# Patient Record
Sex: FEMALE | Race: WHITE | URBAN - METROPOLITAN AREA
[De-identification: names, ages, dates, MRNs, and addresses within clinical notes are randomized per-mention and may not be internally consistent; named-entity substitution may affect disease eponyms.]

---

## 2024-07-25 ENCOUNTER — OFFICE VISIT (OUTPATIENT)
Dept: OBGYN CLINIC | Facility: CLINIC | Age: 24
End: 2024-07-25

## 2024-07-25 VITALS — WEIGHT: 126.2 LBS | DIASTOLIC BLOOD PRESSURE: 67 MMHG | HEART RATE: 89 BPM | SYSTOLIC BLOOD PRESSURE: 108 MMHG

## 2024-07-25 DIAGNOSIS — Z34.92 PRENATAL CARE, SECOND TRIMESTER: ICD-10-CM

## 2024-07-25 DIAGNOSIS — Z59.9 FINANCIAL DIFFICULTIES: ICD-10-CM

## 2024-07-25 DIAGNOSIS — Z3A.29 29 WEEKS GESTATION OF PREGNANCY: Primary | ICD-10-CM

## 2024-07-25 DIAGNOSIS — Z59.41 FOOD INSECURITY: ICD-10-CM

## 2024-07-25 PROBLEM — O09.299 HX OF PREECLAMPSIA, PRIOR PREGNANCY, CURRENTLY PREGNANT: Status: ACTIVE | Noted: 2024-07-25

## 2024-07-25 PROBLEM — I10 CHRONIC HYPERTENSION: Status: ACTIVE | Noted: 2024-07-25

## 2024-07-25 PROCEDURE — G0145 SCR C/V CYTO,THINLAYER,RESCR: HCPCS | Performed by: SPECIALIST

## 2024-07-25 PROCEDURE — 87624 HPV HI-RISK TYP POOLED RSLT: CPT

## 2024-07-25 PROCEDURE — 99204 OFFICE O/P NEW MOD 45 MIN: CPT | Performed by: OBSTETRICS & GYNECOLOGY

## 2024-07-25 PROCEDURE — 87491 CHLMYD TRACH DNA AMP PROBE: CPT

## 2024-07-25 PROCEDURE — G0124 SCREEN C/V THIN LAYER BY MD: HCPCS | Performed by: SPECIALIST

## 2024-07-25 PROCEDURE — 87591 N.GONORRHOEAE DNA AMP PROB: CPT

## 2024-07-25 SDOH — ECONOMIC STABILITY - FOOD INSECURITY: FOOD INSECURITY: Z59.41

## 2024-07-25 SDOH — ECONOMIC STABILITY - INCOME SECURITY: PROBLEM RELATED TO HOUSING AND ECONOMIC CIRCUMSTANCES, UNSPECIFIED: Z59.9

## 2024-07-25 NOTE — PROGRESS NOTES
OB/GYN Prenatal Visit     [id number] used to conduct visit.     SUBJECTIVE:  Kerline Mcdonald is a 23 y.o.  at 29w3d (dated by second trimester ultrasound in scanned records) here for prenatal visit.  She has no obstetric complaints and denies pelvic pain, cramping/contractions, vaginal bleeding, loss of fluid, or decreased fetal movement.     She received prenatal care outside of the [place] for the beginning of this pregnancy and her prior pregnancies. Per records and patient report, she was diagnosed with preeclampsia in a prior pregnancy (). She also has chronic hypertension per records but is not on any medication. All prior deliveries were vaginal and full term per patient.     She states that this pregnancy so far has been uncomplicated. Per records, she had bacterial vaginosis at the beginning of this pregnancy and was treated with metronidazole.   Records from 24: O positive, Hgb 11.2, VDRL and HIV neg  Prenatal labs repeated today, Pap and GC/CT collected today.      Medical History:  Chronic hypertension, not on medication    Surgical History:  Left inguinal hernia repair    Allergies:   None       OBJECTIVE:  Vitals:    24 1548   BP: 108/67   Pulse: 89       FHT: 150 bpm  Fundal height: 29 cm      Physical Exam  Constitutional:       General: She is not in acute distress.     Appearance: Normal appearance.   Genitourinary:      Genitourinary Comments: Normal appearing external female genitalia, normal appearing urethral meatus. On speculum exam, normal appearing vaginal epithelium, no vaginal discharge, no bleeding, grossly normal appearing cervix.    HENT:      Head: Normocephalic and atraumatic.   Eyes:      Extraocular Movements: Extraocular movements intact.   Cardiovascular:      Rate and Rhythm: Normal rate and regular rhythm.   Pulmonary:      Effort: Pulmonary effort is normal. No respiratory distress.      Breath sounds: Normal breath sounds.   Abdominal:      Comments:  gravid   Musculoskeletal:         General: Normal range of motion.      Cervical back: Normal range of motion.   Neurological:      General: No focal deficit present.      Mental Status: She is alert.   Skin:     General: Skin is warm and dry.   Psychiatric:         Mood and Affect: Mood normal.         Behavior: Behavior normal.   Vitals reviewed.           ASSESSMENT / PLAN:    Problem List       29 weeks gestation of pregnancy    Overview     Labs  Pap smear and GC/CT collected today ()  Prenatal panel ordered ()  MSAFP - not ordered, did not present for care until 29 weeks gestation; new to   Genetic screening: will follow-up with [facility]  Vaccines:  Flu: will offer during flu season  Tdap: will offer after 27 weeks; offer at next visit (30 w)   Contraception: **  Feeding plan: **  Birth plan:  **  Delivery consent: ** (sign at 30w visit)  Ultrasounds: [facility] referral placed today (); dated by second trimester ultrasound from records; scanned into chart           Hx of preeclampsia, prior pregnancy, currently pregnant    Chronic hypertension     Other Visit Diagnoses       Financial difficulties        Food insecurity        Prenatal care, second trimester              Tdap, 28w teaching should be offered at next visit.   Delivery consent to be signed at next visit.   Return to clinic in 1 week for prenatal intake visit.  Return to clinic in 2 weeks for prenatal visit.         Linda Gallegos MD  2024  5:58 PM

## 2024-07-26 ENCOUNTER — TELEPHONE (OUTPATIENT)
Age: 24
End: 2024-07-26

## 2024-07-29 ENCOUNTER — TELEPHONE (OUTPATIENT)
Age: 24
End: 2024-07-29

## 2024-07-29 NOTE — TELEPHONE ENCOUNTER
----- Message from Arya Barrera MD sent at 7/26/2024  2:27 PM EDT -----  Regarding: RE: 29 wk transfer of care  Kong Slater,    She needs a detailed MFM US within the next two weeks.    Thanks,    Nick  ----- Message -----  From: Nohemy Pérez  Sent: 7/26/2024   1:02 PM EDT  To: Arya Barrera MD  Subject: 29 wk transfer of care                           Good afternoon Dr. Barrera,    We have a 29 wk transfer patient.    Thank you    Nohemy

## 2024-07-30 ENCOUNTER — PATIENT OUTREACH (OUTPATIENT)
Dept: OBGYN CLINIC | Facility: CLINIC | Age: 24
End: 2024-07-30

## 2024-07-30 LAB
C TRACH DNA SPEC QL NAA+PROBE: NEGATIVE
N GONORRHOEA DNA SPEC QL NAA+PROBE: NEGATIVE

## 2024-07-30 NOTE — PROGRESS NOTES
JESICA HUTCHINS received referral regarding positive SDOH/financial difficulties/food insecurity. GA is 30w1d. EED is 10/7/24. Patient is . Patient does not have insurance coverage. Patient received prenatal care outside of the [place]. Nurse intake is scheduled for .     JESICA HUTCHINS placed call to the patient, Kerline utilizing [employer] Finnish interpretor #[id number] and discussed referral. JESICA HUTCHINS also completed  PN assessment.     Kerline described that she upset at first as the the pregnancy was unplanned but it is now welcomed. She does not want to have more children and wants surgery. She will talk about tubal ligation with the obstetrician at upcoming appointments. She fears having pre-eclampsia again as she had it in a previous pregnancy.     Kerline will be meeting with financial counselors at [facility] after the appointment on Thursday. JESICA HUTCHINS explained separate financial assistance for [facility] ([phone]) for the hospital/delivery. JESICA HUTCHINS reviewed emergency medical assistance requirements and she does not meet the criteria for high risk pregnancy at this time. Kerline would like to review it and JESICA HUTCHINS emailed Finnish version following conversation.     Kerline disclosed that she moved here 1 month ago but had left [facility] in March. She came with documents and immigration accepted them. She estelle not need legal resources at this time.     JESICA HUTCHINS and Kerline discussed WIC. She expressed interst and JESICA HUTCHINS will send more information. She reported food security and not need food bank resources at this time. She has financial concerns but does not need information on baby supplies.     Kerline does not have a mental health or substance use history. Not currently using drugs or alcohol. No history of abuse.     Patient was referred on [employer] to [facility] for food    JESICA HUTCHINS will close referral as requested information was provided. Kerline is aware to contact office or JESICA HUTCHINS directly as needs arise. JESICA HUTCHINS will remain available for  psychosocial support as needed.

## 2024-08-01 ENCOUNTER — APPOINTMENT (OUTPATIENT)
Dept: LAB | Facility: CLINIC | Age: 24
End: 2024-08-01

## 2024-08-01 ENCOUNTER — INITIAL PRENATAL (OUTPATIENT)
Dept: OBGYN CLINIC | Facility: CLINIC | Age: 24
End: 2024-08-01

## 2024-08-01 ENCOUNTER — TELEPHONE (OUTPATIENT)
Dept: OBGYN CLINIC | Facility: CLINIC | Age: 24
End: 2024-08-01

## 2024-08-01 VITALS
HEIGHT: 60 IN | WEIGHT: 125.6 LBS | SYSTOLIC BLOOD PRESSURE: 109 MMHG | HEART RATE: 85 BPM | BODY MASS INDEX: 24.66 KG/M2 | DIASTOLIC BLOOD PRESSURE: 65 MMHG

## 2024-08-01 DIAGNOSIS — Z34.93 PRENATAL CARE IN THIRD TRIMESTER: Primary | ICD-10-CM

## 2024-08-01 DIAGNOSIS — Z3A.29 29 WEEKS GESTATION OF PREGNANCY: ICD-10-CM

## 2024-08-01 DIAGNOSIS — Z3A.30 30 WEEKS GESTATION OF PREGNANCY: ICD-10-CM

## 2024-08-01 DIAGNOSIS — Z34.92 PRENATAL CARE, SECOND TRIMESTER: ICD-10-CM

## 2024-08-01 LAB
ABO GROUP BLD: NORMAL
BACTERIA UR QL AUTO: ABNORMAL /HPF
BASOPHILS # BLD AUTO: 0.03 THOUSANDS/ÂΜL (ref 0–0.1)
BASOPHILS NFR BLD AUTO: 0 % (ref 0–1)
BILIRUB UR QL STRIP: NEGATIVE
BLD GP AB SCN SERPL QL: NEGATIVE
CAOX CRY URNS QL MICRO: ABNORMAL /HPF
CLARITY UR: ABNORMAL
COLOR UR: ABNORMAL
EOSINOPHIL # BLD AUTO: 0.13 THOUSAND/ÂΜL (ref 0–0.61)
EOSINOPHIL NFR BLD AUTO: 1 % (ref 0–6)
ERYTHROCYTE [DISTWIDTH] IN BLOOD BY AUTOMATED COUNT: 13.9 % (ref 11.6–15.1)
FERRITIN SERPL-MCNC: 6 NG/ML (ref 11–307)
GLUCOSE UR STRIP-MCNC: NEGATIVE MG/DL
HBV SURFACE AB SER-ACNC: 5.24 MIU/ML
HBV SURFACE AG SER QL: NORMAL
HCT VFR BLD AUTO: 33.1 % (ref 34.8–46.1)
HCV AB SER QL: NORMAL
HGB BLD-MCNC: 10.4 G/DL (ref 11.5–15.4)
HGB UR QL STRIP.AUTO: NEGATIVE
HPV HR 12 DNA CVX QL NAA+PROBE: POSITIVE
HPV16 DNA CVX QL NAA+PROBE: NEGATIVE
HPV18 DNA CVX QL NAA+PROBE: NEGATIVE
IMM GRANULOCYTES # BLD AUTO: 0.06 THOUSAND/UL (ref 0–0.2)
IMM GRANULOCYTES NFR BLD AUTO: 1 % (ref 0–2)
KETONES UR STRIP-MCNC: NEGATIVE MG/DL
LAB AP GYN PRIMARY INTERPRETATION: ABNORMAL
LEUKOCYTE ESTERASE UR QL STRIP: ABNORMAL
LYMPHOCYTES # BLD AUTO: 2.15 THOUSANDS/ÂΜL (ref 0.6–4.47)
LYMPHOCYTES NFR BLD AUTO: 18 % (ref 14–44)
Lab: ABNORMAL
MCH RBC QN AUTO: 28.3 PG (ref 26.8–34.3)
MCHC RBC AUTO-ENTMCNC: 31.4 G/DL (ref 31.4–37.4)
MCV RBC AUTO: 90 FL (ref 82–98)
MONOCYTES # BLD AUTO: 0.92 THOUSAND/ÂΜL (ref 0.17–1.22)
MONOCYTES NFR BLD AUTO: 8 % (ref 4–12)
MUCOUS THREADS UR QL AUTO: ABNORMAL
NEUTROPHILS # BLD AUTO: 8.79 THOUSANDS/ÂΜL (ref 1.85–7.62)
NEUTS SEG NFR BLD AUTO: 72 % (ref 43–75)
NITRITE UR QL STRIP: NEGATIVE
NON-SQ EPI CELLS URNS QL MICRO: ABNORMAL /HPF
NRBC BLD AUTO-RTO: 0 /100 WBCS
PATH INTERP SPEC-IMP: ABNORMAL
PH UR STRIP.AUTO: 6.5 [PH]
PLATELET # BLD AUTO: 337 THOUSANDS/UL (ref 149–390)
PMV BLD AUTO: 10.4 FL (ref 8.9–12.7)
PROT UR STRIP-MCNC: ABNORMAL MG/DL
RBC # BLD AUTO: 3.67 MILLION/UL (ref 3.81–5.12)
RBC #/AREA URNS AUTO: ABNORMAL /HPF
RH BLD: POSITIVE
RUBV IGG SERPL IA-ACNC: 29.2 IU/ML
SP GR UR STRIP.AUTO: 1.01 (ref 1–1.03)
SPECIMEN EXPIRATION DATE: NORMAL
UROBILINOGEN UR STRIP-ACNC: <2 MG/DL
WBC # BLD AUTO: 12.08 THOUSAND/UL (ref 4.31–10.16)
WBC #/AREA URNS AUTO: ABNORMAL /HPF

## 2024-08-01 PROCEDURE — 86900 BLOOD TYPING SEROLOGIC ABO: CPT

## 2024-08-01 PROCEDURE — 86803 HEPATITIS C AB TEST: CPT

## 2024-08-01 PROCEDURE — 86901 BLOOD TYPING SEROLOGIC RH(D): CPT

## 2024-08-01 PROCEDURE — 81001 URINALYSIS AUTO W/SCOPE: CPT

## 2024-08-01 PROCEDURE — 86850 RBC ANTIBODY SCREEN: CPT

## 2024-08-01 PROCEDURE — 36415 COLL VENOUS BLD VENIPUNCTURE: CPT

## 2024-08-01 PROCEDURE — 87340 HEPATITIS B SURFACE AG IA: CPT

## 2024-08-01 PROCEDURE — 99211 OFF/OP EST MAY X REQ PHY/QHP: CPT

## 2024-08-01 PROCEDURE — 87147 CULTURE TYPE IMMUNOLOGIC: CPT

## 2024-08-01 PROCEDURE — 86706 HEP B SURFACE ANTIBODY: CPT

## 2024-08-01 PROCEDURE — 87086 URINE CULTURE/COLONY COUNT: CPT

## 2024-08-01 PROCEDURE — 87389 HIV-1 AG W/HIV-1&-2 AB AG IA: CPT

## 2024-08-01 PROCEDURE — 85025 COMPLETE CBC W/AUTO DIFF WBC: CPT

## 2024-08-01 PROCEDURE — 82728 ASSAY OF FERRITIN: CPT

## 2024-08-01 PROCEDURE — 86762 RUBELLA ANTIBODY: CPT

## 2024-08-01 PROCEDURE — 86780 TREPONEMA PALLIDUM: CPT

## 2024-08-01 NOTE — LETTER
"    [facility] REFERRAL      Date: 8/1/2024    Patient name: Kerline Mcdonald    YOB: 2000    Estimated Date of Delivery: 10/7/24      /65 (BP Location: Left arm, Patient Position: Sitting, Cuff Size: Standard)   Pulse 85   Ht 4' 11.84\" (1.52 m)   Wt 57 kg (125 lb 9.6 oz)   BMI 24.66 kg/m²         Thank you,  RAE Fitzpatrick            [facility] locations:   1. [facility]       [address] [place] [address]       Phone: [phone]       Fax: [phone]     2. [facility]       [address] [address]       Phone: [phone]       Fax: [phone]       "

## 2024-08-01 NOTE — LETTER
8/1/2024      This letter is to confirm that Kerline Mcdonald is pregnant and is under our care.  Her Estimated Date of Delivery: 10/7/24.    If you have any questions or concerns, please contact our office.  Thank you,    RAE Fitzpatrick

## 2024-08-01 NOTE — PATIENT INSTRUCTIONS
SENALES WIL EL EMBARAZO  Llame a nuestra oficina al [phone] para cualquiera de los siguientes:    1. sangrado vaginal  2. Dolor abdominal thom que no desaparece.  3. Fiebre (más de 100.4 y no se garrett con Tylenol)  4. Vómitos persistentes que fairchild más de 24 horas.  5. dolor de pecho  6. Dolor o ardor al orinar.  7. Dolor de portia severo que no se resuelve con Tylenol  8. Visión borrosa o jerome puntos en schmid visión.  9. Hinchazón repentina de schmid josé o deng.  10. Enrojecimiento, hinchazón o dolor en ju pierna.  11. Un aumento de peso repentino en pocos días.  12. Contar los movimientos fetales del bebé. (después de 28 semanas o el sexto mes de embarazo)  13. Ju pérdida de líquido acuoso de la vagina: puede ser un chorro, un goteo o ju humedad continua  14. Después de 20 semanas de embarazo, calambres rítmicos (más de 4 por hora) o menstruales hollis dolor bajo / pélvico

## 2024-08-01 NOTE — PROGRESS NOTES
"OBSTETRIC INTAKE VISIT    Kerline Mcdonald presents today for initial OB visit and intake at 30w3d. LMP - 24.Pt is a transfer from [place].  28 week teaching done. Breast pump not ordered. History obtained from patient and she reports it as follows:    Past Medical History:   Diagnosis Date    Asthma     as a child    Chronic hypertension     Hx of preeclampsia, prior pregnancy, currently pregnant      Past Surgical History:   Procedure Laterality Date    INGUINAL HERNIA REPAIR Left      OB History    Para Term  AB Living   4 2 2 0 1 2   SAB IAB Ectopic Multiple Live Births   1 0 0 0 2      # Outcome Date GA Lbr Jaswinder/2nd Weight Sex Type Anes PTL Lv   4 Current            3 SAB            2 Term 20 40w0d   M Vag-Spont   ANTHONY      Birth Comments: Preeclampsia   1 Term 17 40w0d   M Vag-Spont   ANTHONY     Social History     Tobacco Use    Smoking status: Never     Passive exposure: Never    Smokeless tobacco: Never   Vaping Use    Vaping status: Never Used   Substance Use Topics    Alcohol use: Not Currently    Drug use: Never       No current outpatient medications    No Known Allergies    Vitals: /65 (BP Location: Left arm, Patient Position: Sitting, Cuff Size: Standard)   Pulse 85   Ht 4' 11.84\" (1.52 m)   Wt 57 kg (125 lb 9.6 oz)   BMI 24.66 kg/m²     Review of Systems:  Denies vaginal bleeding or leaking fluid.  Denies abdominal/pelvic pain or contractions. Pt states baby is moving around a lot.    Plan:  1. OB labwork completed.  Comprehensive metabolic panel and Protein/Creatinine Ratio Urine ordered today.  2. Next ultrasound is scheduled for 8/15/24.  3. Return  24  for H&P prenatal visit.  4. Referrals placed for WIC, .  5. Given vaccines: Pt states received Tdap 24 at 22 weeks. Per Dr Schneider she does not need another dose.  6. Patient's depression screening was assessed with a PHQ-2 score of 0. Their PHQ-9 score was 3. Clinically patient " does not have depression. No treatment is required.   already spoke to pt on Tuesday over the phone.  7. Reviewed the following educational topics with patient:   -routine prenatal visit/ultrasound/labwork schedule   -hospital for delivery and office phone/answering service contact information   -nutritional demands of pregnancy, healthy dietary habits   -listeria, toxoplasmosis, seafood precautions   -weight gain expectations (based on pre-pregnant BMI)   -exercise, rest, and sexual activity during pregnancy   -abstinence from alcohol, tobacco, and illegal drugs   -common discomforts of pregnancy and appropriate management   -OTC medications safe to use in pregnancy   -genetic screening options   -vaccines in pregnancy   -symptoms to report to OB provider    -signs of PTL and pre-eclampsia    -vaginal bleeding/leaking of fluid    -severe n/v-unable to tolerate ANY food/fluids for more than 24 hours

## 2024-08-02 ENCOUNTER — TELEPHONE (OUTPATIENT)
Dept: LABOR AND DELIVERY | Facility: HOSPITAL | Age: 24
End: 2024-08-02

## 2024-08-02 DIAGNOSIS — D50.9 IRON DEFICIENCY ANEMIA: Primary | ICD-10-CM

## 2024-08-02 DIAGNOSIS — D64.9 ANEMIA: ICD-10-CM

## 2024-08-02 PROBLEM — Z78.9 NONIMMUNE TO HEPATITIS B VIRUS: Status: ACTIVE | Noted: 2024-08-02

## 2024-08-02 LAB
HIV 1+2 AB+HIV1 P24 AG SERPL QL IA: NORMAL
HIV 2 AB SERPL QL IA: NORMAL
HIV1 AB SERPL QL IA: NORMAL
HIV1 P24 AG SERPL QL IA: NORMAL
TREPONEMA PALLIDUM IGG+IGM AB [PRESENCE] IN SERUM OR PLASMA BY IMMUNOASSAY: NORMAL

## 2024-08-02 RX ORDER — FERROUS SULFATE 324(65)MG
324 TABLET, DELAYED RELEASE (ENTERIC COATED) ORAL DAILY
Qty: 30 TABLET | Refills: 3 | Status: SHIPPED | OUTPATIENT
Start: 2024-08-02 | End: 2024-11-30

## 2024-08-02 NOTE — TELEPHONE ENCOUNTER
[id number] used to conduct phone conversation.     I called Kerline regarding her lab results from her recent prenatal visit. We discussed her pap smear results as ASCUS and positive for other high risk HPV. We discussed the need for a colposcopy to be scheduled with the office. She expressed understanding.    We also discussed that her hemoglobin and ferritin were low. Oral iron was ordered to her pharmacy. We discussed taking the iron daily. She will pick it up at the pharmacy.     All questions answered to her satisfaction.       Linda Gallegos MD  OBGYN, PGY-I  08/02/24  3:02 PM

## 2024-08-03 LAB
BACTERIA UR CULT: ABNORMAL
BACTERIA UR CULT: ABNORMAL

## 2024-08-06 PROBLEM — B95.1 POSITIVE GBS TEST: Status: ACTIVE | Noted: 2024-08-06

## 2024-08-06 PROBLEM — R82.71 GBS BACTERIURIA: Status: ACTIVE | Noted: 2024-08-06

## 2024-08-06 PROBLEM — R82.71 GBS BACTERIURIA: Status: RESOLVED | Noted: 2024-08-06 | Resolved: 2024-08-06

## 2024-08-12 ENCOUNTER — APPOINTMENT (OUTPATIENT)
Dept: LAB | Facility: CLINIC | Age: 24
End: 2024-08-12

## 2024-08-12 ENCOUNTER — INITIAL PRENATAL (OUTPATIENT)
Dept: OBGYN CLINIC | Facility: CLINIC | Age: 24
End: 2024-08-12

## 2024-08-12 VITALS
HEIGHT: 60 IN | WEIGHT: 128.2 LBS | SYSTOLIC BLOOD PRESSURE: 113 MMHG | BODY MASS INDEX: 25.17 KG/M2 | DIASTOLIC BLOOD PRESSURE: 70 MMHG | HEART RATE: 83 BPM

## 2024-08-12 DIAGNOSIS — O10.919 CHRONIC HYPERTENSION AFFECTING PREGNANCY: ICD-10-CM

## 2024-08-12 DIAGNOSIS — Z3A.30 30 WEEKS GESTATION OF PREGNANCY: ICD-10-CM

## 2024-08-12 DIAGNOSIS — Z34.93 PRENATAL CARE IN THIRD TRIMESTER: ICD-10-CM

## 2024-08-12 DIAGNOSIS — O99.013 MATERNAL IRON DEFICIENCY ANEMIA COMPLICATING PREGNANCY IN THIRD TRIMESTER: ICD-10-CM

## 2024-08-12 DIAGNOSIS — D50.9 MATERNAL IRON DEFICIENCY ANEMIA COMPLICATING PREGNANCY IN THIRD TRIMESTER: ICD-10-CM

## 2024-08-12 DIAGNOSIS — Z3A.32 32 WEEKS GESTATION OF PREGNANCY: ICD-10-CM

## 2024-08-12 DIAGNOSIS — Z34.93 PRENATAL CARE IN THIRD TRIMESTER: Primary | ICD-10-CM

## 2024-08-12 DIAGNOSIS — Z23 ENCOUNTER FOR IMMUNIZATION: ICD-10-CM

## 2024-08-12 PROBLEM — R82.71 GBS BACTERIURIA: Status: ACTIVE | Noted: 2024-08-06

## 2024-08-12 PROBLEM — O99.019 MATERNAL IRON DEFICIENCY ANEMIA COMPLICATING PREGNANCY: Status: ACTIVE | Noted: 2024-08-12

## 2024-08-12 PROBLEM — R87.619 ABNORMAL PAP SMEAR OF CERVIX: Status: ACTIVE | Noted: 2024-08-12

## 2024-08-12 LAB
ALBUMIN SERPL BCG-MCNC: 2.9 G/DL (ref 3.5–5)
ALP SERPL-CCNC: 102 U/L (ref 34–104)
ALT SERPL W P-5'-P-CCNC: 7 U/L (ref 7–52)
ANION GAP SERPL CALCULATED.3IONS-SCNC: 6 MMOL/L (ref 4–13)
AST SERPL W P-5'-P-CCNC: 14 U/L (ref 13–39)
BILIRUB SERPL-MCNC: 0.36 MG/DL (ref 0.2–1)
BUN SERPL-MCNC: 6 MG/DL (ref 5–25)
CALCIUM ALBUM COR SERPL-MCNC: 9.2 MG/DL (ref 8.3–10.1)
CALCIUM SERPL-MCNC: 8.3 MG/DL (ref 8.4–10.2)
CHLORIDE SERPL-SCNC: 106 MMOL/L (ref 96–108)
CO2 SERPL-SCNC: 24 MMOL/L (ref 21–32)
CREAT SERPL-MCNC: 0.47 MG/DL (ref 0.6–1.3)
CREAT UR-MCNC: 73.3 MG/DL
GFR SERPL CREATININE-BSD FRML MDRD: 138 ML/MIN/1.73SQ M
GLUCOSE 1H P 50 G GLC PO SERPL-MCNC: 95 MG/DL (ref 70–134)
GLUCOSE SERPL-MCNC: 93 MG/DL (ref 65–140)
POTASSIUM SERPL-SCNC: 3.6 MMOL/L (ref 3.5–5.3)
PROT SERPL-MCNC: 6.1 G/DL (ref 6.4–8.4)
PROT UR-MCNC: 8 MG/DL
PROT/CREAT UR: 0.11 MG/G{CREAT} (ref 0–0.1)
SODIUM SERPL-SCNC: 136 MMOL/L (ref 135–147)

## 2024-08-12 PROCEDURE — 36415 COLL VENOUS BLD VENIPUNCTURE: CPT

## 2024-08-12 PROCEDURE — 90471 IMMUNIZATION ADMIN: CPT | Performed by: NURSE PRACTITIONER

## 2024-08-12 PROCEDURE — 82950 GLUCOSE TEST: CPT

## 2024-08-12 PROCEDURE — 82570 ASSAY OF URINE CREATININE: CPT

## 2024-08-12 PROCEDURE — 90715 TDAP VACCINE 7 YRS/> IM: CPT | Performed by: NURSE PRACTITIONER

## 2024-08-12 PROCEDURE — 84156 ASSAY OF PROTEIN URINE: CPT

## 2024-08-12 PROCEDURE — 99213 OFFICE O/P EST LOW 20 MIN: CPT | Performed by: NURSE PRACTITIONER

## 2024-08-12 PROCEDURE — 83020 HEMOGLOBIN ELECTROPHORESIS: CPT

## 2024-08-12 PROCEDURE — 80053 COMPREHEN METABOLIC PANEL: CPT

## 2024-08-12 RX ORDER — FERROUS SULFATE 324(65)MG
324 TABLET, DELAYED RELEASE (ENTERIC COATED) ORAL DAILY
Qty: 30 TABLET | Refills: 3 | Status: SHIPPED | OUTPATIENT
Start: 2024-08-12 | End: 2024-12-10

## 2024-08-12 RX ORDER — VITAMIN A ACETATE, .BETA.-CAROTENE, ASCORBIC ACID, CHOLECALCIFEROL, .ALPHA.-TOCOPHEROL ACETATE, DL-, THIAMINE MONONITRATE, RIBOFLAVIN, NIACINAMIDE, PYRIDOXINE HYDROCHLORIDE, FOLIC ACID, CYANOCOBALAMIN, CALCIUM CARBONATE, FERROUS FUMARATE, ZINC OXIDE, CUPRIC OXIDE 9.9; 120; 920; 200; 400; 2; 12; 27; 1; 20; 10; 3; 1.84; 3080; 25 MG/1; MG/1; [IU]/1; MG/1; [IU]/1; MG/1; UG/1; MG/1; MG/1; MG/1; MG/1; MG/1; MG/1; [IU]/1; MG/1
1 TABLET, FILM COATED ORAL DAILY
Qty: 90 TABLET | Refills: 4 | Status: SHIPPED | OUTPATIENT
Start: 2024-08-12

## 2024-08-12 NOTE — PROGRESS NOTES
Kerline Mcdonald presents today for routine OB visit at 32w0d.  Blood Pressure: 113/70  Wt=58.2 kg (128 lb 3.2 oz); Body mass index is 25.17 kg/m².; TWG=10.2 kg (22 lb 6.1 oz)  Fetal Heart Rate: 132; Fundal Height (cm): 29 cm  Abdomen: gravid, soft, non-tender.  She reports no complaints.  Denies uterine contractions.  Denies vaginal bleeding or leaking of fluid.  Reports adequate fetal movement of at least 10 movements in 2 hours once daily.  Scheduled for ultrasound 8/15/24.  Reviewed premature labor precautions and fetal kick counts.  Advised to continue medications and return in 2 weeks.      Current Outpatient Medications   Medication Instructions    ferrous sulfate 324 mg, Oral, Daily    Prenatal Vit-Fe Fumarate-FA (Prenatal Plus Vitamin/Mineral) 27-1 MG TABS 1 tablet, Oral, Daily       Laboratory workup: initial OB labs (done 8/1/24); 28 week labs (done 8/1/24, still needs glucola)    Genetic Screening: not done    Vaccinations: influenza (not indicated outside flu season); Tdap (given 8/12/24); RSV (not indicated outside RSV season); COVID-19 (rec'd 7/28/21 & 8/28/21 & 5/27/22)    Postpartum contraception: desires surgical sterilization but no health insurance so considering Mirena vs Nexplanon    Fetal Ultrasounds:  5/2/24 (16w1d) EDC confirmed, WNL      G4 Pregnancy Problems (from 07/25/24 to present)       Problem Noted Resolved    Abnormal Pap smear 8/12/2024 by RAE Wilson No    Overview Addendum 8/12/2024  9:34 AM by RAE Wilson     ASCUS pap/+ other HRHPV @29 weeks  Needs colposcopy - scheduled for 8/26/24         Maternal anemia 8/12/2024 by RAE Wilson No    Overview Signed 8/12/2024  9:29 AM by RAE Wilson     Needs Fe supplement         GBS bacteriuria 8/6/2024 by Linda Gallegos MD No    Overview Addendum 8/12/2024  9:21 AM by RAE Wilson     Needs intrapartum prophylaxis         Nonimmune to hepatitis B virus 8/2/2024 by Linda Gallegos MD  No    Overview Addendum 8/12/2024  9:21 AM by RAE Wilson     Needs HBV vaccine booster         Hx of preeclampsia 7/25/2024 by Linda Gallegos MD No    Overview Signed 8/12/2024  9:22 AM by RAE Wilson     Needs baseline PEC labs         CHTN 7/25/2024 by Linda Gallegos MD No    Overview Signed 8/12/2024  9:23 AM by RAE Wilson     Needs baseline PEC labs  Serial growth scans  Consider AFS if indicated

## 2024-08-12 NOTE — PATIENT INSTRUCTIONS
Yariel por schmid confianza en nuestro equipo.   Le agradecemos y agradecemos dario comentarios.   Si recibe ju encuesta nuestra, tómese unos momentos para informarnos cómo estamos.   Sinceramente,  DOMINIC WilsonNP       El Tercer Trimestre  (28-42 semanas)   SCHMID BEBÉ   ·        schmid bebé chupa schmid dedo ahora!   ·        schmid bebé puede oír voces y responder al tacto... habla con él o ariana!!   ·        el cerebro de schmid bebé crece y se desarrolla más en los últimos 2 meses de embarazo   ·        portia y huesos del bebé son suaves y flexibles para que quepan por el canal del parto   ·        los movimientos del bebé cambian hacia el final del embarazo porque hay menos espacio para patear y estiramientos en tu vientre   ·        los pulmones del bebé no están completamente desarrollados y totalmente preparados para respirar por dario propios hasta el último 3-4 semanas antes de schmid fecha de vencimiento     TU CUERPO   ·        schmid vientre está creciendo mucho ahora   ·        será más difícil dormir arturo de noche o ser tan activos hollis eres generalmente   ·        puede sudar más de lo habitual   ·        serás más desequilibrado... tenga cuidado de no caer!   ·        Usted puede desarrollar hemorroides (qué pueden ser dolorosas y hacen difícil sentarse)   ·        los dos últimos meses del embarazo puede ser muy incómodos con solange de espalda, solange de portia y ardor de estómago   ·        Puedes empezar a tener contracciones... mientras son irregulares y menos de 5 por hora, esto es ju parte normal de schmid cuerpo a punto de tener un bebé   ·        el soco uterino puede empezar a dilatar y abrir arriba... para estar listo para el parto   ·        Usted puede encontrarse que necesitan hacer orinar muy a menudo... porque el bebé está presionando mucho la vejiga   ·        Usted puede quedarse corta de respiracion más rápido de lo joel          CUENTAS DEL RETROCESO FETAL    En el tercer trimestre (después de 28  semanas de gestación) deben realizar patada fetal cuentas cada día. [place] bebé debe moverse al menos 10 veces en 2 horas maggie un tiempo activo, ju vez al día.    Elegir el atime del día cuando el bebé es más activo. Trate de hacerlo a la misma hora cada día. Entrar en ju posición cómoda y luego escribir el tiempo que tu bebé se mueve alessandra. Cuenta cada movimiento hasta que el bebé se mueve 10 veces. Estos movimientos incluyen patadas, puñetazos, codazos, revolotea o rollos. [place] puede tardar entre 5 minutos a 2 horas. Anote el tiempo que sientes movimiento 10 del bebé.    Si ha pasado 2 horas y tu bebé no se ha movido al menos 10 veces, usted debe llamar a la oficina de inmediato. [phone]          CHERIE DE PARTO PREMATURO     Cuando llamar a [phone]:  * Tengo que llamar inmediatamente si tengo incluso ju pequeña cantidad de LIQUIDO de mi vagina, con o sin contracciones.   * Tengo que llamar si estoy SANGRADO de mi vagina.   * Tengo que llamar si tengo COLICOS que continúa después de beber 2 ó 3 vasos de agua y acostados en mi lado maggie ju hora y que se siente hollis que estoy teniendo un período.   * Tengo que llamar si siento CONTRACCIONES más de 4 veces en ju hora quando siento que mi jeanette se mantiene dura después de que trato de beber 2-3 vasos del agua y acostarme en mi lado maggie ju hora.   * Tengo que llamar si andrew un cambio de mi FLUJO vaginal.   * Tengo que llamar si siento la [place] que siente que el bebé aprieta en mi vagina y dura más de ju hora.   * Tengo que llamar si tengo DOLOR BAJO DE LA ESPALDA que es nueva y está cerca de mi cóccix. Puede entrar y salir varias veces maggie ju hora o quedarse allí constantemente.         LA PRE-ECLAMPSIA    ¿Qué es? La preeclampsia es ju enfermedad grave que puede ocurrir maggie el embarazo se relaciona con la presión arterial neema. Le puede pasar a cualquier tanisha.     ¿Por qué Yes importarme? Las mujeres Care preeclampsia  tienen riesgos graves pueden incluir convulsiones, trazo, daños en los órganos, nacimiento prematuro de schmid bebé. En los peor de los casos, puede causar la muerte de la madre y schmid bebé.     ¿Qué herrera pagar atención ju? Signos y síntomas de la preeclampsia pueden incluir:   * Inflamación severa deng de la josé o las   * Dolor de portia todavía presente después de real tomado Tylenol   * Ajay manchas o cambios en [place]   * Dolor en la parte superior del abdomen o hombro   * [place] náuseas y vómitos (en la segunda mitad del embarazo)   * Aumento de peso repentino   * Dificultad para respirar     ¿Qué herrera hacer? Si usted experimenta alguno de los síntomas de la preeclampsia, comuníquese con schmid proveedor de OB. Encontrar la preeclampsia temprana es importante para usted y schmid bebé. Llámanos al [phone].          LACTANCIA MATERNA     BENEFICIOS PARA LOS BEBÉS   ·       sistemas inmunológicos más kong (menos alergias, eczema, asma y cáncer infantil)   ·       menos diarrea y estreñimiento u otras enfermedades GI   ·       menos resfriados e infecciones del oído   ·       mejor visión y dientes (menos cavidades)   ·       mejora el IQ   ·       menores tasas de diabetes y obesidad en la infancia     BENEFICIOS PARA LAS MADRES   ·        promueve la pérdida de peso más rápida después del parto   ·        james riesgo para la depresión postparto   ·        james riesgo para los cánceres de mama, útero y ovario   ·        jamse riesgo para la osteoporosis en desarrollo con la edad   ·        siempre es más fácil que fórmula - correcto, limpio, y la temperatura adecuada   ·        menos costosa que la fórmula es gratis!!!     CLAVES PARA JU LACTANCIA EXITOSA   ·        mantener bebé piel a piel hasta después de la primera alimentación evento   ·        tener a bebé en schmid cuarto con usted maggie schmid estadía en el hospital después del parto   ·        evitar cualquier botella de alimentación (a menos que sea  médicamente necesario)   ·        limitar el uso de chupones y pañales   ·        Pida ayuda si usted está teniendo problemas... consultores de lactancia (que se especializan en la lactancia) están disponibles para ayudarle a   ·        ju dieta saludable para mamá... comiendo ju variedad de alimentos y raciones con moderación     COSAS QUE DEBES SABER SOBRE LA LACTANCIA   ·        mayoría de los medicamentos se considera compatible con la lactancia materna por la [employer], kandace puedes consultarlo con schmid médico o en lactancia antes de denis un medicamento nuevo... para estar seguro es seguro   ·        alcohol (cerveza, vino, licor) puede transmitirse de la madre al bebé a través de la leche materna... un ocasional, social bebida es considerado aceptable por la [employer]... más que eso deben evitarse   ·        la lactancia materna es NO es un método para evitar embarazo   ·        nicotina (cigarrillos) puede pasar de la madre al bebé a través de la leche materna... sin embargo, para las madres que fuman, es aún más saludable para amamantar que use la fórmula   ·        cafeína debería limitarse a 1-3 tazas por día... incluye café, refrescos, bebidas energéticas           MASAJE EN LA APOLINAR PERINEAL O VAGINAL    Que puedo hacer ahora para reducir las probabilidades de desgarro maggie el parto?  Masaje alrededor del orificio de la vagina realizado por usted misma (o por schmid kiersten).  El masaje en esta apolinar, ya sea antes del parto o maggie la segunda etapa del parto, puede reducir la probabilidad de desgarro perineal maggie el parto.  Asimismo, el uso de compresas tibias en el perineo maggie la etapa expulsiva del parto puede reducir la pravedad del desparro.  [place] sucedera maggie la etapa expulsiva del parto.  En casa tambien puede reducir las probabilidades de sufrir lesiones durnate el parto de con masajes en la apolinar perineal.    Quien?  Todas las mujeres  embarazadas a partir de, aproximadamente, las 34 semanas de embarazo.    Cuando?  Usted o schmid kiersten deben hacer masajes en la deborah vaginal wil 5 a 10 minutos de 1 a 4 veces por semana.    Matilde?  Use ju mezcla de agua y aceite de almendras, luciana u ducwkorth con 1 o 2 dedos (harsha la comodidad).  Inserte los dedos en la vagina entre 3 y 5cm.  Aplique presion general hacia abajo y hacia los costados wil 5 a 10 minutos entre las 3 y las 9 horas, de 1 a 4 veces por semana.          SENALES WIL EL EMBARAZO  Llame a nuestra oficina al [phone] para cualquiera de los siguientes:    1. Sangrado vaginal  2. Dolor abdominal thom que no desaparece.  3. Fiebre (más de 100.4 y no se garrett con Tylenol)  4. Vómitos persistentes que [place] más de 24 horas.  5. dolor de pecho  6. Dolor o ardor al orinar.  7. Dolor de portia severo que no se resuelve con Tylenol  8. Visión borrosa o jerome puntos en schmid visión.  9. Hinchazón repentina de schmid josé o deng.  10. Enrojecimiento, hinchazón o dolor en ju pierna.  11. Un aumento de peso repentino en pocos días.  12. Contar los movimientos fetales del bebé. (después de 28 semanas o el sexto mes de embarazo)  13. Ju pérdida de líquido acuoso de la vagina: puede ser un chorro, un goteo o ju humedad continua  14. Después de 20 semanas de embarazo, calambres rítmicos (más de 4 por hora) o menstruales matilde dolor bajo / pélvico          VACUNAS WIL EL EMBARAZO    TDAP  La tos ferina (o tos ferina) puede ser grave para cualquier persona, kandace para schmid recién nacido, puede ser mortal. Hasta 20 recién nacidos mueren cada año en los [place] debido a la tos ferina. Aproximadamente la mitad de los bebés menores de 1 año de edad que contraen tos ferina necesitan tratamiento en el hospital. Cuanto más joven es el bebé cuando él o ariana son la tos ferina, más probable es que él o ariana tendrá que ser tratado en un hospital. Cuando usted recibe la vacuna contra la tos ferina (Tdap)  maggie schmid embarazo, schmid cuerpo creará anticuerpos protectores y algunos de ellos pasan a schmid bebé antes de nacer. Estos anticuerpos pueden ayudar a proteger a schmid bebé contraigan la tos ferina hasta que tienen edad suficiente para recibir la vacuna ellos mismos (generalmente alrededor de 6 meses de edad).      INFLUENZA  Cambios en las funciones inmunológica, del corazón y pulmón maggie el embarazo te hacen más susceptible a padecer seriamente enfermo de la gripe. Coger la gripe también aumenta las posibilidades de problemas graves para schmid bebé en desarrollo, incluyendo la entrega y el trabajo de parto prematuro. Se recomienda que todas las mujeres que están embarazadas maggie la temporada de gripe deben recibir ju vacuna contra la influenza.

## 2024-08-15 ENCOUNTER — TELEPHONE (OUTPATIENT)
Dept: OBGYN CLINIC | Facility: CLINIC | Age: 24
End: 2024-08-15

## 2024-08-15 ENCOUNTER — HOSPITAL ENCOUNTER (OUTPATIENT)
Facility: HOSPITAL | Age: 24
Discharge: HOME | End: 2024-08-15
Attending: OBSTETRICS & GYNECOLOGY | Admitting: OBSTETRICS & GYNECOLOGY

## 2024-08-15 VITALS — SYSTOLIC BLOOD PRESSURE: 120 MMHG | HEART RATE: 85 BPM | DIASTOLIC BLOOD PRESSURE: 66 MMHG

## 2024-08-15 DIAGNOSIS — R51.9 HEADACHE: Primary | ICD-10-CM

## 2024-08-15 PROBLEM — O26.893 HEADACHE IN PREGNANCY, ANTEPARTUM, THIRD TRIMESTER: Status: ACTIVE | Noted: 2024-08-15

## 2024-08-15 LAB
ALBUMIN SERPL BCG-MCNC: 3.3 G/DL (ref 3.5–5)
ALP SERPL-CCNC: 128 U/L (ref 34–104)
ALT SERPL W P-5'-P-CCNC: 7 U/L (ref 7–52)
ANION GAP SERPL CALCULATED.3IONS-SCNC: 7 MMOL/L (ref 4–13)
AST SERPL W P-5'-P-CCNC: 15 U/L (ref 13–39)
BILIRUB SERPL-MCNC: 0.39 MG/DL (ref 0.2–1)
BUN SERPL-MCNC: 7 MG/DL (ref 5–25)
CALCIUM ALBUM COR SERPL-MCNC: 9.9 MG/DL (ref 8.3–10.1)
CALCIUM SERPL-MCNC: 9.3 MG/DL (ref 8.4–10.2)
CHLORIDE SERPL-SCNC: 104 MMOL/L (ref 96–108)
CO2 SERPL-SCNC: 26 MMOL/L (ref 21–32)
CREAT SERPL-MCNC: 0.56 MG/DL (ref 0.6–1.3)
CREAT UR-MCNC: 100.5 MG/DL
ERYTHROCYTE [DISTWIDTH] IN BLOOD BY AUTOMATED COUNT: 13.7 % (ref 11.6–15.1)
GFR SERPL CREATININE-BSD FRML MDRD: 130 ML/MIN/1.73SQ M
GLUCOSE SERPL-MCNC: 81 MG/DL (ref 65–140)
HCT VFR BLD AUTO: 32.1 % (ref 34.8–46.1)
HGB A MFR BLD: 2.3 % (ref 1.8–3.2)
HGB A MFR BLD: 97.7 % (ref 96.4–98.8)
HGB BLD-MCNC: 10.2 G/DL (ref 11.5–15.4)
HGB F MFR BLD: 0 % (ref 0–2)
HGB FRACT BLD-IMP: NORMAL
HGB S MFR BLD: 0 %
MCH RBC QN AUTO: 26.7 PG (ref 26.8–34.3)
MCHC RBC AUTO-ENTMCNC: 31.8 G/DL (ref 31.4–37.4)
MCV RBC AUTO: 84 FL (ref 82–98)
PLATELET # BLD AUTO: 280 THOUSANDS/UL (ref 149–390)
PMV BLD AUTO: 9.6 FL (ref 8.9–12.7)
POTASSIUM SERPL-SCNC: 4.1 MMOL/L (ref 3.5–5.3)
PROT SERPL-MCNC: 6.9 G/DL (ref 6.4–8.4)
PROT UR-MCNC: 13.2 MG/DL
PROT/CREAT UR: 0.1 MG/G{CREAT} (ref 0–0.1)
RBC # BLD AUTO: 3.82 MILLION/UL (ref 3.81–5.12)
SODIUM SERPL-SCNC: 137 MMOL/L (ref 135–147)
WBC # BLD AUTO: 13.51 THOUSAND/UL (ref 4.31–10.16)

## 2024-08-15 PROCEDURE — 99214 OFFICE O/P EST MOD 30 MIN: CPT | Performed by: OBSTETRICS & GYNECOLOGY

## 2024-08-15 PROCEDURE — NC001 PR NO CHARGE: Performed by: OBSTETRICS & GYNECOLOGY

## 2024-08-15 PROCEDURE — 80053 COMPREHEN METABOLIC PANEL: CPT

## 2024-08-15 PROCEDURE — 84156 ASSAY OF PROTEIN URINE: CPT

## 2024-08-15 PROCEDURE — 82570 ASSAY OF URINE CREATININE: CPT

## 2024-08-15 PROCEDURE — 99203 OFFICE O/P NEW LOW 30 MIN: CPT

## 2024-08-15 PROCEDURE — 85027 COMPLETE CBC AUTOMATED: CPT

## 2024-08-15 RX ORDER — RIBOFLAVIN (VITAMIN B2) 400 MG
400 TABLET ORAL DAILY
Qty: 90 TABLET | Refills: 0 | Status: SHIPPED | OUTPATIENT
Start: 2024-08-15

## 2024-08-15 RX ORDER — CALCIUM CARBONATE/VITAMIN D3 500-10/5ML
400 LIQUID (ML) ORAL DAILY
Qty: 90 TABLET | Refills: 0 | Status: SHIPPED | OUTPATIENT
Start: 2024-08-15

## 2024-08-15 RX ORDER — METOCLOPRAMIDE HYDROCHLORIDE 5 MG/ML
10 INJECTION INTRAMUSCULAR; INTRAVENOUS EVERY 6 HOURS PRN
Status: DISCONTINUED | OUTPATIENT
Start: 2024-08-15 | End: 2024-08-15 | Stop reason: HOSPADM

## 2024-08-15 RX ORDER — ACETAMINOPHEN 325 MG/1
975 TABLET ORAL EVERY 6 HOURS PRN
Status: DISCONTINUED | OUTPATIENT
Start: 2024-08-15 | End: 2024-08-15 | Stop reason: HOSPADM

## 2024-08-15 RX ORDER — MAGNESIUM SULFATE HEPTAHYDRATE 40 MG/ML
2 INJECTION, SOLUTION INTRAVENOUS ONCE
Status: COMPLETED | OUTPATIENT
Start: 2024-08-15 | End: 2024-08-15

## 2024-08-15 RX ADMIN — MAGNESIUM SULFATE HEPTAHYDRATE 2 G: 40 INJECTION, SOLUTION INTRAVENOUS at 16:07

## 2024-08-15 RX ADMIN — SODIUM CHLORIDE 500 ML: 0.9 INJECTION, SOLUTION INTRAVENOUS at 16:06

## 2024-08-15 RX ADMIN — METOCLOPRAMIDE 10 MG: 5 INJECTION, SOLUTION INTRAMUSCULAR; INTRAVENOUS at 16:04

## 2024-08-15 RX ADMIN — ACETAMINOPHEN 975 MG: 325 TABLET ORAL at 15:55

## 2024-08-15 NOTE — PROGRESS NOTES
[facility] recommendation for headache       Chronic prevention of migraines:  Riboflavin 400 mg daily  Magnesium oxide 400 mg daily  Cyproheptadine Category B 4 mg prior to bed or 4 mg after breakfast and 4  mg prior to bedtime  Cyclobenzaprine Category B 10 mg-30 mg every night  Verapamil Category C 40 mg TID or 120 mg extended release daily. Can  increase by 120 mg every 1-2 weeks with a max dose of 360 mg daily  Metoprolol Category C 25 mg BID and can increase by 25 mg BID every 1-2  weeks to a max of 100 mg bid daily.  Gabapentin Category C 300 mg before bedtime for 1 week then increase to  600 mg before bedtime.  Venlafexine Category C 37.5 mg x 3 days then increase by 37.5 mg every 4  days as needed till a max of 150 mg is reached.  There are interactions between Verapamil and Metoprolol and Verapamil and  Cyclobenzaprine so recommend avoiding the use of these together.  Beta blockers are not recommended in patients who smoke and who also have  hypertension as this may increase the risk for a stroke.  Options for acute outpatient treatment include  1. Tylenol PRN.  2. Fioricet tabs q 12 hrs x 2 doses with Reglan 10 mg q12 hs no more then  5 times a month for an acute onset of a headache as prolonged use can  cause rebound headaches.  3. Ibuprofen- Do not use daily or for longer then 48 hrs between 14 - 32  weeks.  4. Imitrex  mg orally. Can give a repeat dose in 2 hrs. Do not use  10 or more times per month. Would limit to second and third trimester  5. Steroids  Prednisone - 60 mg daily x 2 days then decrease by 10 mg q 2 days until  off meds.  Medrol dose pack over 6 days 4 mg x6 tabs, 4 mg x 5 tabs, 4 mg x 4 tabs, 4  mg x 3 tabs, 4 mg x 2 tabs, 4 mg x 1tab  Inpatient treatment-  Toradol 30-60 mg IV between 14-32 weeks  Methelprednisolone 40 mg IV with 2 g Magnesium sulfate IV and 10-20 mg  Reglan IV in triage or ER. Can repeat in 4-6 hrs  Imitrex 6mg sub q. May repeat in 1 hr. Would limit to second and  third  trimester.

## 2024-08-15 NOTE — PROGRESS NOTES
L&D Triage Note - OB/GYN  Kerline Mcdonald 23 y.o. female MRN: [id number]  Unit/Bed#: -01 Encounter: [id number]      ASSESSMENT:    Kerline Mcdonald is a 23 y.o.  at 32w3d who presents here today with headaches for four days associated with nausea/vomiting. I have low suspicion for preeclampsia given her lack of edema, normal blood pressure, and no RUQ pain. Preeclamptic labs were collected and came back as normal.    PLAN:    1) Headaches resolved after the following interventions.  -Given 975 g Tylenol  -Given 10 mg Reglan  -Given 2 g magnesium  -Given 500 ml NaCl bolus  -Urine protein wnl, blood pressure wnl; does not meet criteria for any hypertensive disorder at this time.    2) Pregnancy at 32 weeks and 3 days  -Continue routine prenatal care  -Discharge from OB triage with  labor precautions   -Reviewed rupture of membranes, false vs true labor, decreased fetal movement, and vaginal bleeding  -Pt to call provider with any concerns and follow up at her next scheduled prenatal appointment    Case discussed with Dr. Torres    SUBJECTIVE:    Kerline Mcdonald 23 y.o.  at 32w3d with an Estimated Date of Delivery: 10/7/24 presents here today due to headaches. She says that she has had the headaches for 4 days. It's worsened by loud noises and bright lights and is primarily on the left side of her head. She does not have associated lacrimation. She also says that she started having nausea/vomiting today. She is still able to keep foods and liquids down. Denies vision changes, palpitations, RUQ pain, or edema.    Her current obstetrical history is significant for BV at the beginning of her pregnancy that was treated with metronidazole.    Her past obstetrical history is significant for preeclampsia in 2020.     Contractions: None  Leakage of fluid: Negative  Vaginal Bleeding: Negative  Fetal movement: present    OBJECTIVE:    Vitals:    08/15/24 1514   BP: 117/70   Pulse: 69        ROS:  Constitutional: Positive for  headaches; negative for fevers  Respiratory: Negative for shortness of breath  Cardiovascular: Negative for palpitations, tachycardia,   Gastrointestinal: Positive for nausea and vomiting, no diarrhea or constipation    General Physical Exam:  General: in no apparent distress, non-toxic, in no respiratory distress and acyanotic, alert, afebrile, normal vitals, anicteric, and cooperative  Cardiovascular: Regular rate  Lungs: non-labored breathing  Abdomen: abdomen is soft without significant tenderness, masses, organomegaly or guarding  Lower extremeties: nontender  Neuro exam: CN 2-12 intact; able to move all 4 extremities.    Fetal monitoring:  FHT:  130 bpm/ Moderate 6 - 25 bpm / 15 x 15 accelerations present, no decelerations  [place]: None seen on monitor     Jamie Casillas MD,  8/15/2024 3:28 PM

## 2024-08-15 NOTE — TELEPHONE ENCOUNTER
Patient came in experiencing frequent headache for several days.   advised patient to go to [facility]. Patient verbalized understanding.

## 2024-08-26 ENCOUNTER — PROCEDURE VISIT (OUTPATIENT)
Dept: OBGYN CLINIC | Facility: CLINIC | Age: 24
End: 2024-08-26

## 2024-08-26 VITALS
SYSTOLIC BLOOD PRESSURE: 109 MMHG | HEIGHT: 60 IN | DIASTOLIC BLOOD PRESSURE: 71 MMHG | HEART RATE: 76 BPM | BODY MASS INDEX: 25.45 KG/M2 | WEIGHT: 129.6 LBS

## 2024-08-26 DIAGNOSIS — R87.610 ATYPICAL SQUAMOUS CELLS OF UNDETERMINED SIGNIFICANCE ON CYTOLOGIC SMEAR OF CERVIX (ASC-US): ICD-10-CM

## 2024-08-26 DIAGNOSIS — O10.919 CHRONIC HYPERTENSION AFFECTING PREGNANCY: Primary | ICD-10-CM

## 2024-08-26 DIAGNOSIS — Z3A.32 32 WEEKS GESTATION OF PREGNANCY: ICD-10-CM

## 2024-08-26 DIAGNOSIS — O09.299 HX OF PREECLAMPSIA, PRIOR PREGNANCY, CURRENTLY PREGNANT: ICD-10-CM

## 2024-08-26 PROBLEM — Z3A.34 34 WEEKS GESTATION OF PREGNANCY: Status: ACTIVE | Noted: 2024-07-25

## 2024-08-26 PROCEDURE — 99213 OFFICE O/P EST LOW 20 MIN: CPT | Performed by: OBSTETRICS & GYNECOLOGY

## 2024-08-26 NOTE — PROGRESS NOTES
"Assessment & Plan  23 y.o.  at 32w3d presenting for routine prenatal visit. There was an error noted in her due date. The wrong LMP was entered as 24 but after clarification with patient her exact LMP is 24 and she has regular menses every 28-30d so her due date is 10/18/24.      Problem List       32 weeks gestation of pregnancy    Overview     [place], received care in home country before move  Prenatal labs wnl  [X] TDAP vaccine   [X]Delivery consent  [X]Contraception-would like sterilization but currently without insurance. Considering Nexplanon vs mirena             Hx of preeclampsia    Overview     Baseline labs wnl         CHTN    Overview     Baseline PEC labs wnl  Serial growth scans           Nonimmune to hepatitis B virus    Overview     Needs HBV vaccine booster         GBS bacteriuria    Overview     Needs intrapartum prophylaxis         Atypical squamous cells of undetermined significance on cytologic smear of cervix (ASC-US)    Overview     ASCUS pap/+ other HRHPV @29 weeks  Will need repeat pap smear in one year.         Maternal anemia    Overview     Needs Fe supplement         Headache in pregnancy, antepartum, third trimester     ____________________________________________________________  Subjective  She is without complaint.   She denies contractions, loss of fluid, or vaginal bleeding.   She feels regular fetal movements.       Objective  /71 (BP Location: Left arm, Patient Position: Sitting, Cuff Size: Standard)   Pulse 76   Ht 4' 11.84\" (1.52 m)   Wt 58.8 kg (129 lb 9.6 oz)   LMP 2024 (Exact Date)   BMI 25.45 kg/m²   FHR: 130's via doppler    Physical Exam  Constitutional:       Appearance: She is well-developed.   Cardiovascular:      Rate and Rhythm: Normal rate and regular rhythm.      Heart sounds: Normal heart sounds. No murmur heard.     No friction rub. No gallop.   Pulmonary:      Effort: Pulmonary effort is normal. No respiratory " distress.      Breath sounds: No wheezing.   Abdominal:      Palpations: Abdomen is soft.      Tenderness: There is no abdominal tenderness.   Musculoskeletal:         General: No tenderness.   Neurological:      Mental Status: She is alert and oriented to person, place, and time.   Vitals reviewed.          Patient's Active Problem List  Patient Active Problem List   Diagnosis    32 weeks gestation of pregnancy    Hx of preeclampsia    CHTN    Nonimmune to hepatitis B virus    GBS bacteriuria    Atypical squamous cells of undetermined significance on cytologic smear of cervix (ASC-US)    Maternal anemia    Headache in pregnancy, antepartum, third trimester           Robert Dugan MD  8/26/2024  1:31 PM

## 2024-09-09 ENCOUNTER — ROUTINE PRENATAL (OUTPATIENT)
Facility: HOSPITAL | Age: 24
End: 2024-09-09

## 2024-09-09 VITALS
BODY MASS INDEX: 28.91 KG/M2 | SYSTOLIC BLOOD PRESSURE: 132 MMHG | DIASTOLIC BLOOD PRESSURE: 70 MMHG | WEIGHT: 143.4 LBS | HEART RATE: 93 BPM | HEIGHT: 59 IN

## 2024-09-09 DIAGNOSIS — O09.893 PREVIOUS PREGNANCY COMPLICATED BY PREGNANCY-INDUCED HYPERTENSION IN THIRD TRIMESTER, ANTEPARTUM: ICD-10-CM

## 2024-09-09 DIAGNOSIS — Z34.92 PRENATAL CARE, SECOND TRIMESTER: ICD-10-CM

## 2024-09-09 DIAGNOSIS — O09.33 LIMITED PRENATAL CARE IN THIRD TRIMESTER: ICD-10-CM

## 2024-09-09 DIAGNOSIS — Z3A.34 34 WEEKS GESTATION OF PREGNANCY: Primary | ICD-10-CM

## 2024-09-09 PROCEDURE — 76805 OB US >/= 14 WKS SNGL FETUS: CPT | Performed by: OBSTETRICS & GYNECOLOGY

## 2024-09-09 PROCEDURE — 99243 OFF/OP CNSLTJ NEW/EST LOW 30: CPT | Performed by: OBSTETRICS & GYNECOLOGY

## 2024-09-09 NOTE — LETTER
September 10, 2024     Linda Gallegos MD  [address]  [address]  [place] [address]    Patient: Kerline Mcdonald   YOB: 2000   Date of Visit: 9/9/2024       Dear Dr. Gallegos:    Thank you for referring Kerline Mcdonald to me for evaluation. Below are my notes for this consultation.    If you have questions, please do not hesitate to call me. I look forward to following your patient along with you.         Sincerely,        Cristino Beltran MD        CC: No Recipients

## 2024-09-10 NOTE — PROGRESS NOTES
A fetal ultrasound was completed. See Ob procedures in Epic for an interpretation and recommendations. Do not hesitate to contact us in [facility] if you have questions.    Cristino Beltran MD, MSCE  Maternal Fetal Medicine

## 2024-09-14 ENCOUNTER — NURSE TRIAGE (OUTPATIENT)
Dept: OTHER | Facility: OTHER | Age: 24
End: 2024-09-14

## 2024-09-14 NOTE — TELEPHONE ENCOUNTER
"Reason for Disposition  • [1] Pregnant 24 to 36 weeks () AND [2] pinkish or brownish mucous discharge  (Exception: Single episode of faint spotting when wiping, or slight spotting after intercourse or pelvic exam.)    Answer Assessment - Initial Assessment Questions  1. ONSET: \"When did this bleeding start?\"         Just now in the last 5-10 minutes    2. BLEEDING SEVERITY: \"Describe the bleeding that you are having.\" \"How much bleeding is there?\"       Bright red noticed when wiping.     3. ABDOMEN PAIN: \"Do you have any pain?\" \"How bad is the pain?\"  (e.g., Scale 0-10; none, mild, moderate, or severe)      No abdominal pain  4. PREGNANCY: \"Do you know how many weeks or months pregnant you are?\"       35 wks 1 day    5. SANDRA: \"What date are you expecting to deliver?\"      10/18/24    6. FETAL MOVEMENT: \"Has the baby's movement decreased or changed significantly from normal?\"      Normal FM    7. HIGH-RISK PREGNANCY:  \"Have been told by your doctor that you have a high-risk condition that can cause bleeding?\"  (e.g., placenta previa, vasa previa)       Denies    8. ULTRASOUND: \"Have you had an ultrasound during this pregnancy?\"  Note: To confirm intrauterine pregnancy, placenta location.      N/a  9. HEMODYNAMIC STATUS: \"Are you weak or feeling lightheaded?\" If Yes, ask: \"Can you stand and walk normally?\"       No dizziness     10. OTHER SYMPTOMS: \"What other symptoms are you having with the bleeding?\" (e.g., leaking fluid from vagina, contractions)        No other symptoms.    Protocols used: Pregnancy - Vaginal Bleeding Greater Than 20 Weeks EGA-Adult-AH      Per Vane Mcallister- pt can go to whichever [facility] is closest to her. Pt will go to [facility]   "

## 2024-09-14 NOTE — TELEPHONE ENCOUNTER
"Regardin weeks pregnant/bleeding  ----- Message from Val MILES sent at 2024  5:03 PM EDT -----  \"I am 35 weeks pregnant and I am bleeding. I wanted to know what I should do\"    "

## 2024-09-15 ENCOUNTER — NURSE TRIAGE (OUTPATIENT)
Dept: OTHER | Facility: OTHER | Age: 24
End: 2024-09-15

## 2024-09-15 NOTE — TELEPHONE ENCOUNTER
Reason for Disposition  • Single episode of faint spotting (e.g., noted when wiping after going to bathroom)    Protocols used: Pregnancy - Vaginal Bleeding Greater Than 20 Weeks [id number]

## 2024-09-15 NOTE — TELEPHONE ENCOUNTER
"Answer Assessment - Initial Assessment Questions  1. ONSET: \"When did this bleeding start?\"         5p-10p last night    2. BLEEDING SEVERITY: \"Describe the bleeding that you are having.\" \"How much bleeding is there?\"       No bleeding today    3. ABDOMEN PAIN: \"Do you have any pain?\" \"How bad is the pain?\"  (e.g., Scale 0-10; none, mild, moderate, or severe)      None presently    4. PREGNANCY: \"Do you know how many weeks or months pregnant you are?\"       G4 35w    5. SANDRA: \"What date are you expecting to deliver?\"      Oct 18    6. FETAL MOVEMENT: \"Has the baby's movement decreased or changed significantly from normal?\"      Moving normally    7. HIGH-RISK PREGNANCY:  \"Have been told by your doctor that you have a high-risk condition that can cause bleeding?\"  (e.g., placenta previa, vasa previa)       Denies    8. ULTRASOUND: \"Have you had an ultrasound during this pregnancy?\"  Note: To confirm intrauterine pregnancy, placenta location.      Yes    9. HEMODYNAMIC STATUS: \"Are you weak or feeling lightheaded?\" If Yes, ask: \"Can you stand and walk normally?\"       Feels well    10. OTHER SYMPTOMS: \"What other symptoms are you having with the bleeding?\" (e.g., leaking fluid from vagina, contractions)        No vaginal discharge at this time    Protocols used: Pregnancy - Vaginal Bleeding Greater Than 20 Weeks EGA-Adult-      Clarified with patient that episode was spotting in nature.  On call provider advised home monitoring.    Patient advised and red flags for callback reviewed; pt verbalized understanding.  "

## 2024-09-19 ENCOUNTER — ROUTINE PRENATAL (OUTPATIENT)
Dept: OBGYN CLINIC | Facility: CLINIC | Age: 24
End: 2024-09-19

## 2024-09-19 VITALS
SYSTOLIC BLOOD PRESSURE: 133 MMHG | WEIGHT: 146.6 LBS | HEART RATE: 96 BPM | BODY MASS INDEX: 29.61 KG/M2 | DIASTOLIC BLOOD PRESSURE: 77 MMHG

## 2024-09-19 DIAGNOSIS — D50.9 MATERNAL IRON DEFICIENCY ANEMIA COMPLICATING PREGNANCY IN THIRD TRIMESTER: ICD-10-CM

## 2024-09-19 DIAGNOSIS — Z34.93 PRENATAL CARE IN THIRD TRIMESTER: Primary | ICD-10-CM

## 2024-09-19 DIAGNOSIS — O99.013 MATERNAL IRON DEFICIENCY ANEMIA COMPLICATING PREGNANCY IN THIRD TRIMESTER: ICD-10-CM

## 2024-09-19 DIAGNOSIS — O10.919 CHRONIC HYPERTENSION AFFECTING PREGNANCY: ICD-10-CM

## 2024-09-19 DIAGNOSIS — Z3A.35 35 WEEKS GESTATION OF PREGNANCY: ICD-10-CM

## 2024-09-19 PROCEDURE — 90678 RSV VACC PREF BIVALENT IM: CPT | Performed by: NURSE PRACTITIONER

## 2024-09-19 PROCEDURE — 90471 IMMUNIZATION ADMIN: CPT | Performed by: NURSE PRACTITIONER

## 2024-09-19 PROCEDURE — 99213 OFFICE O/P EST LOW 20 MIN: CPT | Performed by: NURSE PRACTITIONER

## 2024-09-19 RX ORDER — SODIUM CHLORIDE 9 MG/ML
20 INJECTION, SOLUTION INTRAVENOUS ONCE
Status: CANCELLED | OUTPATIENT
Start: 2024-09-25

## 2024-09-19 NOTE — PATIENT INSTRUCTIONS
Yariel por schmid confianza en nuestro equipo.   Le agradecemos y agradecemos dario comentarios.   Si recibe ju encuesta nuestra, tómese unos momentos para informarnos cómo estamos.   Sinceramente,  DOMINIC WilsonNP       El Tercer Trimestre  (28-42 semanas)   SCHMID BEBÉ   ·        schmid bebé chupa schmid dedo ahora!   ·        schmid bebé puede oír voces y responder al tacto... habla con él o ariana!!   ·        el cerebro de schmid bebé crece y se desarrolla más en los últimos 2 meses de embarazo   ·        portia y huesos del bebé son suaves y flexibles para que quepan por el canal del parto   ·        los movimientos del bebé cambian hacia el final del embarazo porque hay menos espacio para patear y estiramientos en tu vientre   ·        los pulmones del bebé no están completamente desarrollados y totalmente preparados para respirar por dario propios hasta el último 3-4 semanas antes de schmid fecha de vencimiento     TU CUERPO   ·        schmid vientre está creciendo mucho ahora   ·        será más difícil dormir arturo de noche o ser tan activos hollis eres generalmente   ·        puede sudar más de lo habitual   ·        serás más desequilibrado... tenga cuidado de no caer!   ·        Usted puede desarrollar hemorroides (qué pueden ser dolorosas y hacen difícil sentarse)   ·        los dos últimos meses del embarazo puede ser muy incómodos con solange de espalda, solange de portia y ardor de estómago   ·        Puedes empezar a tener contracciones... mientras son irregulares y menos de 5 por hora, esto es ju parte normal de schmid cuerpo a punto de tener un bebé   ·        el soco uterino puede empezar a dilatar y abrir arriba... para estar listo para el parto   ·        Usted puede encontrarse que necesitan hacer orinar muy a menudo... porque el bebé está presionando mucho la vejiga   ·        Usted puede quedarse corta de respiracion más rápido de lo joel          CUENTAS DEL RETROCESO FETAL    En el tercer trimestre (después de 28  semanas de gestación) deben realizar patada fetal cuentas cada día. [place] bebé debe moverse al menos 10 veces en 2 horas maggie un tiempo activo, ju vez al día.    Elegir el atime del día cuando el bebé es más activo. Trate de hacerlo a la misma hora cada día. Entrar en ju posición cómoda y luego escribir el tiempo que tu bebé se mueve alessandra. Cuenta cada movimiento hasta que el bebé se mueve 10 veces. Estos movimientos incluyen patadas, puñetazos, codazos, revolotea o rollos. [place] puede tardar entre 5 minutos a 2 horas. Anote el tiempo que sientes movimiento 10 del bebé.    Si ha pasado 2 horas y tu bebé no se ha movido al menos 10 veces, usted debe llamar a la oficina de inmediato. [phone]          CHERIE DE PARTO PREMATURO     Cuando llamar a [phone]:  * Tengo que llamar inmediatamente si tengo incluso ju pequeña cantidad de LIQUIDO de mi vagina, con o sin contracciones.   * Tengo que llamar si estoy SANGRADO de mi vagina.   * Tengo que llamar si tengo COLICOS que continúa después de beber 2 ó 3 vasos de agua y acostados en mi lado maggie ju hora y que se siente hollis que estoy teniendo un período.   * Tengo que llamar si siento CONTRACCIONES más de 4 veces en ju hora quando siento que mi jeanette se mantiene dura después de que trato de beber 2-3 vasos del agua y acostarme en mi lado maggie ju hora.   * Tengo que llamar si andrew un cambio de mi FLUJO vaginal.   * Tengo que llamar si siento la [place] que siente que el bebé aprieta en mi vagina y dura más de ju hora.   * Tengo que llamar si tengo DOLOR BAJO DE LA ESPALDA que es nueva y está cerca de mi cóccix. Puede entrar y salir varias veces maggie ju hora o quedarse allí constantemente.         LA PRE-ECLAMPSIA    ¿Qué es? La preeclampsia es ju enfermedad grave que puede ocurrir maggie el embarazo se relaciona con la presión arterial neema. Le puede pasar a cualquier tanisha.     ¿Por qué Yes importarme? Las mujeres Care preeclampsia  tienen riesgos graves pueden incluir convulsiones, trazo, daños en los órganos, nacimiento prematuro de schmid bebé. En los peor de los casos, puede causar la muerte de la madre y schmid bebé.     ¿Qué herrera pagar atención ju? Signos y síntomas de la preeclampsia pueden incluir:   * Inflamación severa deng de la josé o las   * Dolor de portia todavía presente después de real tomado Tylenol   * Ajay manchas o cambios en [place]   * Dolor en la parte superior del abdomen o hombro   * [place] náuseas y vómitos (en la segunda mitad del embarazo)   * Aumento de peso repentino   * Dificultad para respirar     ¿Qué herrera hacer? Si usted experimenta alguno de los síntomas de la preeclampsia, comuníquese con schmid proveedor de OB. Encontrar la preeclampsia temprana es importante para usted y schmid bebé. Llámanos al [phone].          LACTANCIA MATERNA     BENEFICIOS PARA LOS BEBÉS   ·       sistemas inmunológicos más kong (menos alergias, eczema, asma y cáncer infantil)   ·       menos diarrea y estreñimiento u otras enfermedades GI   ·       menos resfriados e infecciones del oído   ·       mejor visión y dientes (menos cavidades)   ·       mejora el IQ   ·       menores tasas de diabetes y obesidad en la infancia     BENEFICIOS PARA LAS MADRES   ·        promueve la pérdida de peso más rápida después del parto   ·        james riesgo para la depresión postparto   ·        james riesgo para los cánceres de mama, útero y ovario   ·        james riesgo para la osteoporosis en desarrollo con la edad   ·        siempre es más fácil que fórmula - correcto, limpio, y la temperatura adecuada   ·        menos costosa que la fórmula es gratis!!!     CLAVES PARA JU LACTANCIA EXITOSA   ·        mantener bebé piel a piel hasta después de la primera alimentación evento   ·        tener a bebé en schmid cuarto con usted maggie schmid estadía en el hospital después del parto   ·        evitar cualquier botella de alimentación (a menos que sea  médicamente necesario)   ·        limitar el uso de chupones y pañales   ·        Pida ayuda si usted está teniendo problemas... consultores de lactancia (que se especializan en la lactancia) están disponibles para ayudarle a   ·        ju dieta saludable para mamá... comiendo ju variedad de alimentos y raciones con moderación     COSAS QUE DEBES SABER SOBRE LA LACTANCIA   ·        mayoría de los medicamentos se considera compatible con la lactancia materna por la [employer], kandace puedes consultarlo con schmid médico o en lactancia antes de denis un medicamento nuevo... para estar seguro es seguro   ·        alcohol (cerveza, vino, licor) puede transmitirse de la madre al bebé a través de la leche materna... un ocasional, social bebida es considerado aceptable por la [employer]... más que eso deben evitarse   ·        la lactancia materna es NO es un método para evitar embarazo   ·        nicotina (cigarrillos) puede pasar de la madre al bebé a través de la leche materna... sin embargo, para las madres que fuman, es aún más saludable para amamantar que use la fórmula   ·        cafeína debería limitarse a 1-3 tazas por día... incluye café, refrescos, bebidas energéticas           MASAJE EN LA APOLINAR PERINEAL O VAGINAL    Que puedo hacer ahora para reducir las probabilidades de desgarro maggie el parto?  Masaje alrededor del orificio de la vagina realizado por usted misma (o por schmid kiersten).  El masaje en esta apolinar, ya sea antes del parto o maggie la segunda etapa del parto, puede reducir la probabilidad de desgarro perineal maggie el parto.  Asimismo, el uso de compresas tibias en el perineo maggie la etapa expulsiva del parto puede reducir la pravedad del desparro.  [place] sucedera maggie la etapa expulsiva del parto.  En casa tambien puede reducir las probabilidades de sufrir lesiones durnate el parto de con masajes en la apolinar perineal.    Quien?  Todas las mujeres  embarazadas a partir de, aproximadamente, las 34 semanas de embarazo.    Cuando?  Usted o schmid kiersten deben hacer masajes en la deborah vaginal wil 5 a 10 minutos de 1 a 4 veces por semana.    [place]?  Use ju mezcla de agua y aceite de almendras, luciana u duckworth con 1 o 2 dedos (harsha la comodidad).  Inserte los dedos en la vagina entre 3 y 5cm.  Aplique presion general hacia abajo y hacia los costados wil 5 a 10 minutos entre las 3 y las 9 horas, de 1 a 4 veces por semana.          SENALES WIL EL EMBARAZO  Llame a nuestra oficina al [phone] para cualquiera de los siguientes:    1. Sangrado vaginal  2. Dolor abdominal thom que no desaparece.  3. Fiebre (más de 100.4 y no se garrett con Tylenol)  4. Vómitos persistentes que [place] más de 24 horas.  5. dolor de pecho  6. Dolor o ardor al orinar.  7. Dolor de portia severo que no se resuelve con Tylenol  8. Visión borrosa o jerome puntos en schmid visión.  9. Hinchazón repentina de schmid josé o deng.  10. Enrojecimiento, hinchazón o dolor en ju pierna.  11. Un aumento de peso repentino en pocos días.  12. Contar los movimientos fetales del bebé. (después de 28 semanas o el sexto mes de embarazo)  13. Ju pérdida de líquido acuoso de la vagina: puede ser un chorro, un goteo o ju humedad continua  14. Después de 20 semanas de embarazo, calambres rítmicos (más de 4 por hora) o menstruales matilde dolor bajo / pélvico          VACUNAS WIL EL EMBARAZO    TDAP  La tos ferina (o tos ferina) puede ser grave para cualquier persona, kandace para schmid recién nacido, puede ser mortal. Hasta 20 recién nacidos mueren cada año en los [place] debido a la tos ferina. Aproximadamente la mitad de los bebés menores de 1 año de edad que contraen tos ferina necesitan tratamiento en el hospital. Cuanto más joven es el bebé cuando él o ariana son la tos ferina, más probable es que él o ariana tendrá que ser tratado en un hospital. Cuando usted recibe la vacuna contra la tos ferina (Tdap)  maggie schmid embarazo, schmid cuerpo creará anticuerpos protectores y algunos de ellos pasan a schmid bebé antes de nacer. Estos anticuerpos pueden ayudar a proteger a schmid bebé contraigan la tos ferina hasta que tienen edad suficiente para recibir la vacuna ellos mismos (generalmente alrededor de 6 meses de edad).      INFLUENZA  Cambios en las funciones inmunológica, del corazón y pulmón maggie el embarazo te hacen más susceptible a padecer seriamente enfermo de la gripe. Coger la gripe también aumenta las posibilidades de problemas graves para schmid bebé en desarrollo, incluyendo la entrega y el trabajo de parto prematuro. Se recomienda que todas las mujeres que están embarazadas maggie la temporada de gripe deben recibir ju vacuna contra la influenza.

## 2024-09-19 NOTE — PROGRESS NOTES
Kerline Mcdonald presents today for routine OB visit at 35w6d.  Blood Pressure: 133/77  Wt=66.5 kg (146 lb 9.6 oz); Body mass index is 29.61 kg/m².; TWG=18.5 kg (40 lb 12.5 oz)  Fetal Heart Rate: 149; Fundal Height (cm): 35 cm  Abdomen: gravid, soft, non-tender.  She reports not tolerating PO iron.  Orders placed for IV iron therapy.  Reports occasional mild uterine contractions.  Denies vaginal bleeding or leaking of fluid.  Reports adequate fetal movement of at least 10 movements in 2 hours once daily.  Reviewed premature labor precautions and fetal kick counts.  Advised to continue medications and return in 1 week.      Current Outpatient Medications   Medication Instructions    Prenatal Vit-Fe Fumarate-FA (Prenatal Plus Vitamin/Mineral) 27-1 MG TABS 1 tablet, Oral, Daily       Laboratory workup: initial OB labs (done 8/1/24); 28 week labs (done 8/1/24 & 8/12/24)    Genetic Screening: not done    Vaccinations: influenza (will offer during influenza season); Tdap (given 8/12/24); RSV (given 9/19/24); COVID-19 (rec'd 7/28/21 & 8/28/21 & 5/27/22)    Postpartum contraception: desires surgical sterilization but no health insurance so considering Mirena vs Nexplanon    Fetal Ultrasounds:  5/2/24 (16w1d) EDC confirmed, WNL  9/9/24 (34w3d) no previa, EFW=72%, AC=83%, SHARIF=12.1cm, danica WNL w/missed views, vertex      G4 Pregnancy Problems (from 07/25/24 to present)       Problem Noted Resolved    ASCUS pap 8/12/2024 by RAE Wilson No    Overview Addendum 9/19/2024  2:01 PM by RAE Wilson     ASCUS pap/+ other HRHPV @29 weeks  Needs repeat pap in 1 year         Maternal anemia 8/12/2024 by RAE Wilson No    Overview Addendum 9/19/2024  2:26 PM by RAE Wilson     Needs Fe supplement - not tolerating PO  Ferritin=6  IV iron ordered x8 doses 9/19/24         GBS bacteriuria 8/6/2024 by Linda Gallegos MD No    Overview Addendum 8/12/2024  9:21 AM by RAE Wilson      Needs intrapartum prophylaxis         Nonimmune to hepatitis B virus 8/2/2024 by Linda Gallegos MD No    Overview Addendum 8/12/2024  9:21 AM by RAE Wilson     Needs HBV vaccine booster         Hx of preeclampsia 7/25/2024 by Linda Gallegos MD No    Overview Addendum 9/19/2024  2:03 PM by RAE Wilson     Needs baseline PEC labs - done WNL         CHTN 7/25/2024 by Linda Gallegos MD No    Overview Addendum 9/19/2024  2:02 PM by RAE Wilson     Needs baseline PEC labs - done WNL  Serial growth scans - done WNL

## 2024-09-25 ENCOUNTER — ROUTINE PRENATAL (OUTPATIENT)
Dept: OBGYN CLINIC | Facility: CLINIC | Age: 24
End: 2024-09-25

## 2024-09-25 ENCOUNTER — HOSPITAL ENCOUNTER (OUTPATIENT)
Dept: INFUSION CENTER | Facility: HOSPITAL | Age: 24
Discharge: HOME | End: 2024-09-25

## 2024-09-25 VITALS
HEART RATE: 89 BPM | BODY MASS INDEX: 30.04 KG/M2 | WEIGHT: 149 LBS | SYSTOLIC BLOOD PRESSURE: 137 MMHG | HEIGHT: 59 IN | DIASTOLIC BLOOD PRESSURE: 87 MMHG

## 2024-09-25 VITALS
HEART RATE: 91 BPM | TEMPERATURE: 97.5 F | SYSTOLIC BLOOD PRESSURE: 117 MMHG | RESPIRATION RATE: 18 BRPM | DIASTOLIC BLOOD PRESSURE: 72 MMHG

## 2024-09-25 DIAGNOSIS — D50.9 MATERNAL IRON DEFICIENCY ANEMIA COMPLICATING PREGNANCY IN THIRD TRIMESTER: Primary | ICD-10-CM

## 2024-09-25 DIAGNOSIS — O99.013 MATERNAL IRON DEFICIENCY ANEMIA COMPLICATING PREGNANCY IN THIRD TRIMESTER: Primary | ICD-10-CM

## 2024-09-25 DIAGNOSIS — Z34.93 PRENATAL CARE IN THIRD TRIMESTER: Primary | ICD-10-CM

## 2024-09-25 DIAGNOSIS — Z3A.36 36 WEEKS GESTATION OF PREGNANCY: ICD-10-CM

## 2024-09-25 PROCEDURE — 96365 THER/PROPH/DIAG IV INF INIT: CPT

## 2024-09-25 PROCEDURE — 99213 OFFICE O/P EST LOW 20 MIN: CPT | Performed by: NURSE PRACTITIONER

## 2024-09-25 RX ORDER — SODIUM CHLORIDE 9 MG/ML
20 INJECTION, SOLUTION INTRAVENOUS ONCE
Status: CANCELLED | OUTPATIENT
Start: 2024-09-30

## 2024-09-25 RX ORDER — SODIUM CHLORIDE 9 MG/ML
20 INJECTION, SOLUTION INTRAVENOUS ONCE
Status: COMPLETED | OUTPATIENT
Start: 2024-09-25 | End: 2024-09-25

## 2024-09-25 RX ADMIN — SODIUM CHLORIDE 20 ML/HR: 0.9 INJECTION, SOLUTION INTRAVENOUS at 13:49

## 2024-09-25 RX ADMIN — IRON SUCROSE 200 MG: 20 INJECTION, SOLUTION INTRAVENOUS at 13:51

## 2024-09-25 NOTE — PATIENT INSTRUCTIONS
Yariel por schmid confianza en nuestro equipo.   Le agradecemos y agradecemos dario comentarios.   Si recibe ju encuesta nuestra, tómese unos momentos para informarnos cómo estamos.   Sinceramente,  RAE Wilson       CHERIE DE PARTO   Llame a nuestra oficina al [phone] para cualquiera de los siguientes:    * Necesito llamar inmediatamente yo si tengo incluso ju pequeña cantidad de LIQUIDO se escapa de mi vagina, con o sin contracciones.   * Katie llamar si tengo SANGRADO ju cantidad igual o más que un período. Ju pequeña cantidad de flujo vaginal sangriento es normal al final del embarazo.   * Katie llamar si tengo CONTRACCIONES cada gigi minutos maggie al menos ju hora. Necesito un reloj para tiempo. Yo katie tiempo desde el comienzo de ju contracción hasta el comienzo de la siguiente.   * De ser necesario ANTES DE  ir al hospital.   * Necesito tener un plan para TRANSPORTE a ir al hospital cuando estoy en trabajo de parto. Trabajo generalmente no es ju emergencia que requiere ju ambulancia.          CUENTAS DEL RETROCESO FETAL    En el tercer trimestre (después de 28 semanas de gestación) deben realizar patada fetal cuentas cada día. Schmid bebé debe moverse al menos 10 veces en 2 horas maggie un tiempo activo, ju vez al día.    Elegir el atime del día cuando el bebé es más activo. Trate de hacerlo a la misma hora cada día. Entrar en ju posición cómoda y luego escribir el tiempo que tu bebé se mueve alessandra. Cuenta cada movimiento hasta que el bebé se mueve 10 veces. Estos movimientos incluyen patadas, puñetazos, codazos, revolotea o rollos. [place] puede tardar entre 5 minutos a 2 horas. Anote el tiempo que sientes movimiento 10 del bebé.    Si ha pasado 2 horas y tu bebé no se ha movido al menos 10 veces, usted debe llamar a la oficina de inmediato. [phone]          MASAJE EN LA APOLINAR PERINEAL O VAGINAL    Que puedo hacer ahora para reducir las probabilidades de desgarro maggie el  parto?  Masaje alrededor del orificio de la vagina realizado por usted misma (o por schmid kiersten).  El masaje en esta deborah, ya sea antes del parto o maggie la segunda etapa del parto, puede reducir la probabilidad de desgarro perineal maggie el parto.  Asimismo, el uso de compresas tibias en el perineo maggie la etapa expulsiva del parto puede reducir la pravedad del desparro.  [place] sucedera maggie la etapa expulsiva del parto.  En casa tambien puede reducir las probabilidades de sufrir lesiones durnate el parto de con masajes en la deborah perineal.    Quien?  Todas las mujeres embarazadas a partir de, aproximadamente, las 34 semanas de embarazo.    Cuando?  Usted o schmid kiersten deben hacer masajes en la deborah vaginal maggie 5 a 10 minutos de 1 a 4 veces por semana.    [place]?  Use ju mezcla de agua y aceite de almendras, luciana u duckworth con 1 o 2 dedos (harsha la comodidad).  Inserte los dedos en la vagina entre 3 y 5cm.  Aplique presion general hacia abajo y hacia los costados maggie 5 a 10 minutos entre las 3 y las 9 horas, de 1 a 4 veces por semana.          GROUP B STREP    Group B Strep (GBS) es ju bacteria vaginal común. Aproximadamente el 25% de las mujeres normalmente tienen la bacteria GBS en la vagina. NO es ju infección de transmisión sexual. ¡De hecho, no es ju infección! Es solo ju bacteria vaginal normal para muchas mujeres.    Sin embargo, la bacteria GBS puede ser peligrosas para un bebé recién nacido si el bebé está expuesto a gaston bacteria particular maggie el parto y el nacimiento Y desarrolla ju infección de la bacteria. La probabilidad de ju infección de GBS en recién nacidos para ju tanisha que tiene las bacteria GBS en la vagina es cerca de 1% - 2%. Judie si la infeccion produce, un bebé podría ser gravemente enfermo.    Por esta razón, hacemos un cultivo vaginal (Q-Tip de la vagina y el recto) para todas las mujeres embarazadas en el aproximadamente 36 semanas de embarazo. Si el cultivo  demuestra que hay bacteria GBS presente, no es ju razón para el pánico! Porque en esta situación dará ritu antibióticos de la tanisha a través de schmid IV mientras está en parto. Cuando ju madre se trata con antibióticos maggie el parto, schmid bebé AMANDO NUNCA se infecta con la bacteria GBS.

## 2024-09-25 NOTE — PROGRESS NOTES
Pt tolerated Venofer today with no adverse reactions. AVS provided and next apt confirmed 9/30/24 @ 1:30.  Left unit ambulatory with a steady gait.

## 2024-09-30 ENCOUNTER — HOSPITAL ENCOUNTER (OUTPATIENT)
Dept: INFUSION CENTER | Facility: HOSPITAL | Age: 24
Discharge: HOME | End: 2024-09-30

## 2024-09-30 DIAGNOSIS — O99.013 MATERNAL IRON DEFICIENCY ANEMIA COMPLICATING PREGNANCY IN THIRD TRIMESTER: Primary | ICD-10-CM

## 2024-09-30 DIAGNOSIS — D50.9 MATERNAL IRON DEFICIENCY ANEMIA COMPLICATING PREGNANCY IN THIRD TRIMESTER: Primary | ICD-10-CM

## 2024-09-30 PROCEDURE — 96365 THER/PROPH/DIAG IV INF INIT: CPT

## 2024-09-30 RX ORDER — SODIUM CHLORIDE 9 MG/ML
20 INJECTION, SOLUTION INTRAVENOUS ONCE
Status: CANCELLED | OUTPATIENT
Start: 2024-10-03

## 2024-09-30 RX ORDER — SODIUM CHLORIDE 9 MG/ML
20 INJECTION, SOLUTION INTRAVENOUS ONCE
Status: COMPLETED | OUTPATIENT
Start: 2024-09-30 | End: 2024-09-30

## 2024-09-30 RX ADMIN — SODIUM CHLORIDE 20 ML/HR: 0.9 INJECTION, SOLUTION INTRAVENOUS at 13:38

## 2024-09-30 RX ADMIN — IRON SUCROSE 200 MG: 20 INJECTION, SOLUTION INTRAVENOUS at 13:39

## 2024-09-30 NOTE — PROGRESS NOTES
Patient tolerated treatment today without complications. Patient confirmed next appointment for 10/3 at 1400. Print out declined.

## 2024-10-03 ENCOUNTER — HOSPITAL ENCOUNTER (INPATIENT)
Facility: HOSPITAL | Age: 24
LOS: 3 days | Discharge: HOME | DRG: 560 | End: 2024-10-06
Attending: OBSTETRICS & GYNECOLOGY | Admitting: OBSTETRICS & GYNECOLOGY
Payer: COMMERCIAL

## 2024-10-03 ENCOUNTER — ANESTHESIA (INPATIENT)
Dept: ANESTHESIOLOGY | Facility: HOSPITAL | Age: 24
End: 2024-10-03
Payer: COMMERCIAL

## 2024-10-03 ENCOUNTER — ANESTHESIA EVENT (INPATIENT)
Dept: ANESTHESIOLOGY | Facility: HOSPITAL | Age: 24
End: 2024-10-03
Payer: COMMERCIAL

## 2024-10-03 ENCOUNTER — HOSPITAL ENCOUNTER (OUTPATIENT)
Dept: INFUSION CENTER | Facility: HOSPITAL | Age: 24
End: 2024-10-03
Payer: COMMERCIAL

## 2024-10-03 ENCOUNTER — ROUTINE PRENATAL (OUTPATIENT)
Dept: OBGYN CLINIC | Facility: CLINIC | Age: 24
End: 2024-10-03

## 2024-10-03 VITALS
TEMPERATURE: 97.9 F | SYSTOLIC BLOOD PRESSURE: 126 MMHG | RESPIRATION RATE: 18 BRPM | DIASTOLIC BLOOD PRESSURE: 76 MMHG | HEART RATE: 91 BPM

## 2024-10-03 VITALS
HEIGHT: 59 IN | HEART RATE: 82 BPM | BODY MASS INDEX: 30.08 KG/M2 | DIASTOLIC BLOOD PRESSURE: 88 MMHG | WEIGHT: 149.2 LBS | SYSTOLIC BLOOD PRESSURE: 149 MMHG

## 2024-10-03 DIAGNOSIS — Z59.12 INADEQUATE HOUSING UTILITIES: ICD-10-CM

## 2024-10-03 DIAGNOSIS — D50.9 MATERNAL IRON DEFICIENCY ANEMIA COMPLICATING PREGNANCY IN THIRD TRIMESTER: Primary | ICD-10-CM

## 2024-10-03 DIAGNOSIS — Z78.9 NONIMMUNE TO HEPATITIS B VIRUS: ICD-10-CM

## 2024-10-03 DIAGNOSIS — Z59.9 FINANCIAL DIFFICULTIES: ICD-10-CM

## 2024-10-03 DIAGNOSIS — Z59.41 FOOD INSECURITY: ICD-10-CM

## 2024-10-03 DIAGNOSIS — O99.013 MATERNAL IRON DEFICIENCY ANEMIA COMPLICATING PREGNANCY IN THIRD TRIMESTER: Primary | ICD-10-CM

## 2024-10-03 DIAGNOSIS — O10.919 CHRONIC HYPERTENSION AFFECTING PREGNANCY: ICD-10-CM

## 2024-10-03 DIAGNOSIS — O09.299 HX OF PREECLAMPSIA, PRIOR PREGNANCY, CURRENTLY PREGNANT: ICD-10-CM

## 2024-10-03 DIAGNOSIS — Z3A.37 37 WEEKS GESTATION OF PREGNANCY: Primary | Chronic | ICD-10-CM

## 2024-10-03 DIAGNOSIS — R82.71 GBS BACTERIURIA: ICD-10-CM

## 2024-10-03 DIAGNOSIS — Z3A.37 37 WEEKS GESTATION OF PREGNANCY: ICD-10-CM

## 2024-10-03 PROBLEM — Z34.90 ENCOUNTER FOR INDUCTION OF LABOR: Status: ACTIVE | Noted: 2024-10-03

## 2024-10-03 LAB
ABO GROUP BLD: NORMAL
ALBUMIN SERPL BCG-MCNC: 3.3 G/DL (ref 3.5–5)
ALP SERPL-CCNC: 207 U/L (ref 34–104)
ALT SERPL W P-5'-P-CCNC: 14 U/L (ref 7–52)
ANION GAP SERPL CALCULATED.3IONS-SCNC: 6 MMOL/L (ref 4–13)
AST SERPL W P-5'-P-CCNC: 24 U/L (ref 13–39)
BILIRUB SERPL-MCNC: 0.4 MG/DL (ref 0.2–1)
BLD GP AB SCN SERPL QL: NEGATIVE
BUN SERPL-MCNC: 7 MG/DL (ref 5–25)
CALCIUM ALBUM COR SERPL-MCNC: 9.3 MG/DL (ref 8.3–10.1)
CALCIUM SERPL-MCNC: 8.7 MG/DL (ref 8.4–10.2)
CHLORIDE SERPL-SCNC: 107 MMOL/L (ref 96–108)
CO2 SERPL-SCNC: 21 MMOL/L (ref 21–32)
CREAT SERPL-MCNC: 0.53 MG/DL (ref 0.6–1.3)
CREAT UR-MCNC: 25.6 MG/DL
ERYTHROCYTE [DISTWIDTH] IN BLOOD BY AUTOMATED COUNT: 17.7 % (ref 11.6–15.1)
GFR SERPL CREATININE-BSD FRML MDRD: 133 ML/MIN/1.73SQ M
GLUCOSE SERPL-MCNC: 73 MG/DL (ref 65–140)
HCT VFR BLD AUTO: 34.5 % (ref 34.8–46.1)
HGB BLD-MCNC: 10.7 G/DL (ref 11.5–15.4)
MCH RBC QN AUTO: 26 PG (ref 26.8–34.3)
MCHC RBC AUTO-ENTMCNC: 31 G/DL (ref 31.4–37.4)
MCV RBC AUTO: 84 FL (ref 82–98)
PLATELET # BLD AUTO: 245 THOUSANDS/UL (ref 149–390)
PMV BLD AUTO: 10.3 FL (ref 8.9–12.7)
POTASSIUM SERPL-SCNC: 3.9 MMOL/L (ref 3.5–5.3)
PROT SERPL-MCNC: 6.6 G/DL (ref 6.4–8.4)
PROT UR-MCNC: 30.7 MG/DL
PROT/CREAT UR: 1.2 MG/G{CREAT} (ref 0–0.1)
RBC # BLD AUTO: 4.11 MILLION/UL (ref 3.81–5.12)
RH BLD: POSITIVE
SODIUM SERPL-SCNC: 134 MMOL/L (ref 135–147)
SPECIMEN EXPIRATION DATE: NORMAL
TREPONEMA PALLIDUM IGG+IGM AB [PRESENCE] IN SERUM OR PLASMA BY IMMUNOASSAY: NORMAL
WBC # BLD AUTO: 9.61 THOUSAND/UL (ref 4.31–10.16)

## 2024-10-03 PROCEDURE — 86780 TREPONEMA PALLIDUM: CPT

## 2024-10-03 PROCEDURE — 80053 COMPREHEN METABOLIC PANEL: CPT

## 2024-10-03 PROCEDURE — 90656 IIV3 VACC NO PRSV 0.5 ML IM: CPT | Performed by: OBSTETRICS & GYNECOLOGY

## 2024-10-03 PROCEDURE — 84156 ASSAY OF PROTEIN URINE: CPT

## 2024-10-03 PROCEDURE — 86850 RBC ANTIBODY SCREEN: CPT

## 2024-10-03 PROCEDURE — 99212 OFFICE O/P EST SF 10 MIN: CPT

## 2024-10-03 PROCEDURE — 90471 IMMUNIZATION ADMIN: CPT | Performed by: OBSTETRICS & GYNECOLOGY

## 2024-10-03 PROCEDURE — 10907ZC DRAINAGE OF AMNIOTIC FLUID, THERAPEUTIC FROM PRODUCTS OF CONCEPTION, VIA NATURAL OR ARTIFICIAL OPENING: ICD-10-PCS | Performed by: OBSTETRICS & GYNECOLOGY

## 2024-10-03 PROCEDURE — 3E033VJ INTRODUCTION OF OTHER HORMONE INTO PERIPHERAL VEIN, PERCUTANEOUS APPROACH: ICD-10-PCS | Performed by: OBSTETRICS & GYNECOLOGY

## 2024-10-03 PROCEDURE — 99213 OFFICE O/P EST LOW 20 MIN: CPT | Performed by: OBSTETRICS & GYNECOLOGY

## 2024-10-03 PROCEDURE — 86900 BLOOD TYPING SEROLOGIC ABO: CPT

## 2024-10-03 PROCEDURE — NC001 PR NO CHARGE: Performed by: OBSTETRICS & GYNECOLOGY

## 2024-10-03 PROCEDURE — 96365 THER/PROPH/DIAG IV INF INIT: CPT

## 2024-10-03 PROCEDURE — 85027 COMPLETE CBC AUTOMATED: CPT

## 2024-10-03 PROCEDURE — 86901 BLOOD TYPING SEROLOGIC RH(D): CPT

## 2024-10-03 PROCEDURE — 82570 ASSAY OF URINE CREATININE: CPT

## 2024-10-03 RX ORDER — SODIUM CHLORIDE 9 MG/ML
20 INJECTION, SOLUTION INTRAVENOUS ONCE
Status: COMPLETED | OUTPATIENT
Start: 2024-10-03 | End: 2024-10-03

## 2024-10-03 RX ORDER — ACETAMINOPHEN 325 MG/1
975 TABLET ORAL EVERY 6 HOURS PRN
Status: DISCONTINUED | OUTPATIENT
Start: 2024-10-03 | End: 2024-10-06 | Stop reason: HOSPADM

## 2024-10-03 RX ORDER — ROPIVACAINE HYDROCHLORIDE 2 MG/ML
INJECTION, SOLUTION EPIDURAL; INFILTRATION; PERINEURAL
Status: COMPLETED | OUTPATIENT
Start: 2024-10-03 | End: 2024-10-03

## 2024-10-03 RX ORDER — ONDANSETRON 2 MG/ML
4 INJECTION INTRAMUSCULAR; INTRAVENOUS EVERY 6 HOURS PRN
Status: DISCONTINUED | OUTPATIENT
Start: 2024-10-03 | End: 2024-10-04

## 2024-10-03 RX ORDER — DIPHENHYDRAMINE HYDROCHLORIDE 50 MG/ML
12.5 INJECTION INTRAMUSCULAR; INTRAVENOUS EVERY 6 HOURS PRN
Status: DISCONTINUED | OUTPATIENT
Start: 2024-10-03 | End: 2024-10-04

## 2024-10-03 RX ORDER — BUPIVACAINE HYDROCHLORIDE 2.5 MG/ML
30 INJECTION, SOLUTION EPIDURAL; INFILTRATION; INTRACAUDAL ONCE AS NEEDED
Status: DISCONTINUED | OUTPATIENT
Start: 2024-10-03 | End: 2024-10-04

## 2024-10-03 RX ORDER — OXYTOCIN/RINGER'S LACTATE 30/500 ML
1-30 PLASTIC BAG, INJECTION (ML) INTRAVENOUS
Status: DISCONTINUED | OUTPATIENT
Start: 2024-10-03 | End: 2024-10-04

## 2024-10-03 RX ORDER — ROPIVACAINE HYDROCHLORIDE 2 MG/ML
INJECTION, SOLUTION EPIDURAL; INFILTRATION; PERINEURAL CONTINUOUS PRN
Status: DISCONTINUED | OUTPATIENT
Start: 2024-10-03 | End: 2024-10-04 | Stop reason: HOSPADM

## 2024-10-03 RX ORDER — CALCIUM CARBONATE 500 MG/1
500 TABLET, CHEWABLE ORAL DAILY PRN
Status: DISCONTINUED | OUTPATIENT
Start: 2024-10-03 | End: 2024-10-04

## 2024-10-03 RX ORDER — SODIUM CHLORIDE 9 MG/ML
20 INJECTION, SOLUTION INTRAVENOUS ONCE
OUTPATIENT
Start: 2024-10-07

## 2024-10-03 RX ORDER — SODIUM CHLORIDE, SODIUM LACTATE, POTASSIUM CHLORIDE, CALCIUM CHLORIDE 600; 310; 30; 20 MG/100ML; MG/100ML; MG/100ML; MG/100ML
125 INJECTION, SOLUTION INTRAVENOUS CONTINUOUS
Status: DISCONTINUED | OUTPATIENT
Start: 2024-10-03 | End: 2024-10-04

## 2024-10-03 RX ADMIN — ROPIVACAINE HYDROCHLORIDE 10 ML/HR: 2 INJECTION, SOLUTION EPIDURAL; INFILTRATION at 21:06

## 2024-10-03 RX ADMIN — ROPIVACAINE HYDROCHLORIDE 10 ML: 2 INJECTION, SOLUTION EPIDURAL; INFILTRATION; PERINEURAL at 21:08

## 2024-10-03 RX ADMIN — SODIUM CHLORIDE 5 MILLION UNITS: 0.9 INJECTION, SOLUTION INTRAVENOUS at 18:06

## 2024-10-03 RX ADMIN — SODIUM CHLORIDE, SODIUM LACTATE, POTASSIUM CHLORIDE, AND CALCIUM CHLORIDE 999 ML/HR: .6; .31; .03; .02 INJECTION, SOLUTION INTRAVENOUS at 21:04

## 2024-10-03 RX ADMIN — SODIUM CHLORIDE 2.5 MILLION UNITS: 9 INJECTION, SOLUTION INTRAVENOUS at 21:57

## 2024-10-03 RX ADMIN — ROPIVACAINE HYDROCHLORIDE: 2 INJECTION, SOLUTION EPIDURAL; INFILTRATION at 21:17

## 2024-10-03 RX ADMIN — SODIUM CHLORIDE 20 ML/HR: 9 INJECTION, SOLUTION INTRAVENOUS at 14:09

## 2024-10-03 RX ADMIN — IRON SUCROSE 200 MG: 20 INJECTION, SOLUTION INTRAVENOUS at 14:09

## 2024-10-03 RX ADMIN — Medication 2 MILLI-UNITS/MIN: at 19:20

## 2024-10-03 RX ADMIN — SODIUM CHLORIDE, SODIUM LACTATE, POTASSIUM CHLORIDE, AND CALCIUM CHLORIDE 125 ML/HR: .6; .31; .03; .02 INJECTION, SOLUTION INTRAVENOUS at 18:06

## 2024-10-03 SDOH — ECONOMIC STABILITY - FOOD INSECURITY: FOOD INSECURITY: Z59.41

## 2024-10-03 SDOH — ECONOMIC STABILITY - INCOME SECURITY: PROBLEM RELATED TO HOUSING AND ECONOMIC CIRCUMSTANCES, UNSPECIFIED: Z59.9

## 2024-10-03 SDOH — ECONOMIC STABILITY - HOUSING INSECURITY: INADEQUATE HOUSING UTILITIES: Z59.12

## 2024-10-03 NOTE — ASSESSMENT & PLAN NOTE
Prenatal visit completed today. Patient feels well. /83, repeat 149/88. Sent to OB triage for preeclampsia labs    Labs  GBS positive   Up to date with prenatal labs  Vaccines  Flu administered  Delivery and postpartum planning  Mode of delivery: anticipate  with epidural  Feeding plan: breast and formula  Contraception plan: Nexplanon    Return to clinic in 1 week for routine prenatal visit.

## 2024-10-03 NOTE — ASSESSMENT & PLAN NOTE
IOL for cHTN not on meds, Bps uptrending  Admit to OB/GYN service  Follow up CBC, RPR, Blood Type  EFW: 72nd% at 35w5d  VTX by transabdominal ultrasound   Induction method FB + pit  GBS pos status:  PCN for prophylaxis   Analgesia and/or epidural at patient request  Anticipate   Desires nexplanon for contraception

## 2024-10-03 NOTE — PROGRESS NOTES
Flu given to patient in left arm on 10/3/2024.    NDC: 91302-356-06  LOT: [id number]  EXP: 06/30/2025

## 2024-10-03 NOTE — PROGRESS NOTES
Patient tolerated IV Venofer without complications. Next appointment scheduled. AVS declined-has mychart.

## 2024-10-03 NOTE — H&P
H & P- Obstetrics   Kerline Mcdonald 23 y.o. female MRN: [id number]  Unit/Bed#: LD TRIAGE  Encounter: [id number]      Assessment/Plan:    Kerline is a 23 y.o.  at 37w6d admitted for an induction of labor for cHTN    SVE: Cervical Dilation: Closed  Cervical Effacement: 0  Cervical Consistency: Medium  Fetal Station: -3  Presentation: Vertex  Method: Manual  OB Examiner: Esvin    Maternal anemia  Assessment & Plan  S/p venofer infusions, f/u admit CBC    ASCUS pap  Assessment & Plan  Needs repeat pap in 1 year    GBS bacteriuria  Assessment & Plan  PCN in labor    Nonimmune to hepatitis B virus  Assessment & Plan  Recommend vaccination PP    CHTN  Assessment & Plan  Blood pressure continue to rise  Patient reports intermittent swelling, mild headache today  F/U pre-e labs and resolution of headache  Proceed with induction of labor for the indication of cHTN 37w-39w as per ACOG    Hx of preeclampsia  Assessment & Plan  F/u admission pre e labs  F/U resolution of headache    37 weeks gestation of pregnancy  Assessment & Plan  Late transfer of care from [place]   O Pos, GBS neg    * Encounter for induction of labor  Assessment & Plan  IOL for cHTN not on meds, Bps uptrending  Admit to OB/GYN service  Follow up CBC, RPR, Blood Type  EFW: 72nd% at 35w5d  VTX by transabdominal ultrasound   Induction method FB + pit  GBS pos status:  PCN for prophylaxis   Analgesia and/or epidural at patient request  Anticipate   Desires nexplanon for contraception           Patient of: [facility]  This patient will be an INPATIENT  and I certify the anticipated length of stay is >2 Midnights  Discussed with Dr. Torres      SUBJECTIVE:    Chief Complaint: elevated blood pressures in the clinic    HPI: Kerline Mcdonald is a 23 y.o.  with an SANDRA of 10/18/2024, by Last Menstrual Period at 37w6d who is being admitted for IOL for uptitrating blood pressures. She denies having uterine  contractions, has no LOF, and reports no VB. She states she has felt good FM.. This pregnancy is complicated by the issues above. All other review of systems is negative.       Pregnancy Plan:  Pregnancy: La  Fetal sex: Female     Delivery Plans  Deliver by GA (weeks): 42  Planned delivery method: Vaginal  Planned delivery location: [facility] L&D  Planned anesthesia: Epidural  Acceptable blood products: All     Post-Delivery Plans  Feeding intentions: Breast Milk  Planned birth control: Implant      Patient Active Problem List   Diagnosis    37 weeks gestation of pregnancy    Hx of preeclampsia    CHTN    Nonimmune to hepatitis B virus    GBS bacteriuria    ASCUS pap    Maternal anemia    Encounter for induction of labor       OB History    Para Term  AB Living   4 2 2 0 1 2   SAB IAB Ectopic Multiple Live Births   1 0 0 0 2      # Outcome Date GA Lbr Jaswinder/2nd Weight Sex Type Anes PTL Lv   4 Current            3 SAB            2 Term 20 40w0d   M Vag-Spont   ANTHONY      Birth Comments: Preeclampsia   1 Term 17 40w0d   M Vag-Spont   ANTHONY       Past Medical History:   Diagnosis Date    Abnormal Pap smear of cervix     2024 ASCUS pap/+ other HRHPV    Asthma     Hypertension     Preeclampsia        Past Surgical History:   Procedure Laterality Date    INGUINAL HERNIA REPAIR Left        Social History     Tobacco Use    Smoking status: Never     Passive exposure: Never    Smokeless tobacco: Never   Substance Use Topics    Alcohol use: Not Currently       No Known Allergies      Medications Prior to Admission:     Prenatal Vit-Fe Fumarate-FA (Prenatal Plus Vitamin/Mineral) 27-1 MG TABS        OBJECTIVE:  Vitals:  Temp:  [97.9 °F (36.6 °C)] 97.9 °F (36.6 °C)  HR:  [75-91] 82  BP: (126-155)/(76-88) 149/88  Resp:  [18] 18  There is no height or weight on file to calculate BMI.     Physical Exam:  General: Well appearing, no distress  Respiratory: Unlabored breathing  Cardiovascular: Regular  "rate.  Abdomen: Soft, gravid, nontender  Fundal Height: Appropriate for gestational age.  Extremities: Warm and well perfused.  Non tender.  Psychiatric: Behavioral normal        FHT:  Baseline 130, moderate variability, accels present, no decels    TOCO:   No CTX on toco      Prenatal Labs: Blood Type:   Lab Results   Component Value Date/Time    ABO Grouping O 08/01/2024 10:30 AM     , D (Rh type):   Lab Results   Component Value Date/Time    Rh Factor Positive 08/01/2024 10:30 AM     , Antibody Screen: negative  , HCT/HGB:   Lab Results   Component Value Date/Time    Hematocrit 32.1 (L) 08/15/2024 03:30 PM    Hemoglobin 10.2 (L) 08/15/2024 03:30 PM      , Platelets:   Lab Results   Component Value Date/Time    Platelets 280 08/15/2024 03:30 PM      , 1 hour Glucola:   Lab Results   Component Value Date/Time    Glucose 95 08/12/2024 11:16 AM   , Varicella: No results found for: \"VARICELLAIGG\"    , Rubella:   Lab Results   Component Value Date/Time    Rubella IgG Quant 29.2 08/01/2024 10:30 AM        , VDRL/RPR: nonreactive     , Urine Culture/Screen:   Lab Results   Component Value Date/Time    Urine Culture (A) 08/01/2024 10:30 AM     <10,000 cfu/ml Beta Hemolytic Streptococcus Group B    Urine Culture 80,000-89,000 cfu/ml Lactobacillus species (A) 08/01/2024 10:30 AM       , Hep B:   Lab Results   Component Value Date/Time    Hepatitis B Surface Ag Non-reactive 08/01/2024 10:30 AM     , Hep C: nonreactive   , HIV: nonreactive  , Chlamydia: neg   , Gonorrhea:   Lab Results   Component Value Date/Time    N gonorrhoeae, DNA Probe Negative 07/25/2024 05:08 PM     , Group B Strep:  pos      Vane Mcallister, DO  10/3/2024  5:35 PM        Portions of the record may have been created with voice recognition software.  Occasional wrong word or \"sound a like\" substitutions may have occurred due to the inherent limitations of voice recognition software.  Read the chart carefully and recognize, using context, where substitutions " have occurred

## 2024-10-03 NOTE — ASSESSMENT & PLAN NOTE
Blood pressure continue to rise  Patient reports intermittent swelling, mild headache today  F/U pre-e labs and resolution of headache  Proceed with induction of labor for the indication of cHTN 37w-39w as per ACOG

## 2024-10-03 NOTE — OB LABOR/OXYTOCIN SAFETY PROGRESS
Labor Progress Note - Kerline Mcdonald 23 y.o. female MRN: [id number]    Unit/Bed#: -01 Encounter: [id number]       Contraction Frequency (minutes): 0  Contraction Intensity: Mild  Uterine Activity Characteristics: Irritability  Cervical Dilation: Closed        Cervical Effacement: 0  Fetal Station: -3  Baseline Rate (FHR): 130 bpm     FHR Category: cat 1               Vital Signs:   Vitals:    10/03/24 1853   BP: 148/83   Pulse: 67   Resp:    Temp:        Notes/comments:     PROCEDURE:  GARCIA BALLOON PLACEMENT    A 24F garcia with a 30cc balloon was selected, a speculum examination was performed and the cervix was located. A garcia balloon was introduced over sterile gloved hands. Balloon advanced through cervix with a ringed forceps beyond the internal cervical os. A small amount amount of sterile saline solution was instilled in the balloon to confirm placement. Placement was confirmed to be beyond the internal cervical os. A total of 60cc of sterile saline solution was placed into the balloon. Pt tolerated well. Instructions left with RN to place garcia to gravity with a 1L bag of IV fluid. Notify DO/MD when garcia dislodged.    Plan to start juanpablo Mcallister DO 10/3/2024 7:13 PM

## 2024-10-03 NOTE — PROGRESS NOTES
OB/GYN Prenatal Visit    ASSESSMENT / PLAN:  1. 37 weeks gestation of pregnancy  Assessment & Plan:  Prenatal visit completed today. Patient feels well. /83, repeat 149/88. Sent to OB triage for preeclampsia labs    Labs  GBS positive   Up to date with prenatal labs  Vaccines  Flu administered  Delivery and postpartum planning  Mode of delivery: anticipate  with epidural  Feeding plan: breast and formula  Contraception plan: Nexplanon    Return to clinic in 1 week for routine prenatal visit.  Orders:  -     influenza vaccine preservative-free 0.5 mL IM (Fluzone, Afluria, Fluarix, Flulaval)  2. GBS bacteriuria  Assessment & Plan:  - intrapartum PCN  3. Nonimmune to hepatitis B virus  Assessment & Plan:  - offer vaccine PP  4. Hx of preeclampsia  Assessment & Plan:  Labs wnl 24  - continue to monitor BP  - continue to monitor for preE sx  5. CHTN  Assessment & Plan:  - continue to monitor BP  - continue to monitor for preE sx  6. Financial difficulties  -     Ambulatory referral to social work care management program; Future; Expected date: 10/03/2024  7. Food insecurity  -     Ambulatory referral to social work care management program; Future; Expected date: 10/03/2024  8. Inadequate housing utilities  -     Ambulatory referral to social work care management program; Future; Expected date: 10/03/2024    SUBJECTIVE:  Kerline Mcdonald is a 23 y.o.  at 37w6d here for prenatal visit. PMH is significant for chronic hypertension (not on meds), anemia, preeclampsia in prior pregnancy. This pregnancy is complicated by hepatitis B nonimmune status, GBS positive status, ASCUS discovered on Pap 24.    She endorses occasional abdominal cramping and denies vaginal bleeding, loss of fluid, or decreased fetal movement.     Initial BP in clinic 155/83, repeat 149/88. Will send to OB triage for preeclampsia labs.    OBJECTIVE:  Vitals:    10/03/24 1619   BP: 149/88   Pulse: 82     FHT: 125bpm  Fundal height:  34cm    Physical Exam:  General: Well appearing, no acute distress  Cardiovascular: Regular rate  Respiratory: Unlabored breathing on room air  Abdomen: Soft, gravid, nontender    Patient seen and examined with attending physician Dr. Jennie Kennedy MD  OBGYN PGY-1  10/03/24  4:48 PM

## 2024-10-03 NOTE — ASSESSMENT & PLAN NOTE
Lochia WNL   Recovering well   Appropriate bowel and bladder function   Pain well controlled   Tolerating diet   Ambulating without issues   No lower extremity tenderness  GBS unknown   Rh positive    Contraception : nexplanon

## 2024-10-04 LAB
BASE EXCESS BLDCOA CALC-SCNC: -3.2 MMOL/L (ref 3–11)
BASE EXCESS BLDCOV CALC-SCNC: -4.6 MMOL/L (ref 1–9)
HCO3 BLDCOA-SCNC: 23.2 MMOL/L (ref 17.3–27.3)
HCO3 BLDCOV-SCNC: 20.3 MMOL/L (ref 12.2–28.6)
O2 CT VFR BLDCOA CALC: 16.3 ML/DL
OXYHGB MFR BLDCOA: 62.9 %
OXYHGB MFR BLDCOV: 60.8 %
PCO2 BLDCOA: 46 MM[HG] (ref 30–60)
PCO2 BLDCOV: 37.3 MM HG (ref 27–43)
PH BLDCOA: 7.32 [PH] (ref 7.23–7.43)
PH BLDCOV: 7.35 [PH] (ref 7.19–7.49)
PO2 BLDCOA: 26.7 MM HG (ref 5–25)
PO2 BLDCOV: 23.8 MM HG (ref 15–45)
SAO2 % BLDCOV: 15.1 ML/DL

## 2024-10-04 PROCEDURE — 59409 OBSTETRICAL CARE: CPT | Performed by: OBSTETRICS & GYNECOLOGY

## 2024-10-04 PROCEDURE — 82805 BLOOD GASES W/O2 SATURATION: CPT | Performed by: OBSTETRICS & GYNECOLOGY

## 2024-10-04 RX ORDER — ONDANSETRON 2 MG/ML
4 INJECTION INTRAMUSCULAR; INTRAVENOUS EVERY 8 HOURS PRN
Status: DISCONTINUED | OUTPATIENT
Start: 2024-10-04 | End: 2024-10-06 | Stop reason: HOSPADM

## 2024-10-04 RX ORDER — OXYTOCIN/RINGER'S LACTATE 30/500 ML
250 PLASTIC BAG, INJECTION (ML) INTRAVENOUS ONCE
Status: DISCONTINUED | OUTPATIENT
Start: 2024-10-04 | End: 2024-10-05

## 2024-10-04 RX ORDER — CALCIUM CARBONATE 500 MG/1
1000 TABLET, CHEWABLE ORAL DAILY PRN
Status: DISCONTINUED | OUTPATIENT
Start: 2024-10-04 | End: 2024-10-06 | Stop reason: HOSPADM

## 2024-10-04 RX ORDER — SENNOSIDES 8.6 MG
1 TABLET ORAL DAILY
Status: DISCONTINUED | OUTPATIENT
Start: 2024-10-04 | End: 2024-10-06 | Stop reason: HOSPADM

## 2024-10-04 RX ORDER — IBUPROFEN 600 MG/1
600 TABLET, FILM COATED ORAL EVERY 6 HOURS SCHEDULED
Status: DISCONTINUED | OUTPATIENT
Start: 2024-10-04 | End: 2024-10-06 | Stop reason: HOSPADM

## 2024-10-04 RX ORDER — BENZOCAINE/MENTHOL 6 MG-10 MG
1 LOZENGE MUCOUS MEMBRANE DAILY PRN
Status: DISCONTINUED | OUTPATIENT
Start: 2024-10-04 | End: 2024-10-06 | Stop reason: HOSPADM

## 2024-10-04 RX ORDER — ACETAMINOPHEN 325 MG/1
650 TABLET ORAL EVERY 6 HOURS SCHEDULED
Status: DISCONTINUED | OUTPATIENT
Start: 2024-10-04 | End: 2024-10-06 | Stop reason: HOSPADM

## 2024-10-04 RX ORDER — DIPHENHYDRAMINE HCL 25 MG
25 TABLET ORAL EVERY 6 HOURS PRN
Status: DISCONTINUED | OUTPATIENT
Start: 2024-10-04 | End: 2024-10-06 | Stop reason: HOSPADM

## 2024-10-04 RX ORDER — SIMETHICONE 80 MG
80 TABLET,CHEWABLE ORAL 4 TIMES DAILY PRN
Status: DISCONTINUED | OUTPATIENT
Start: 2024-10-04 | End: 2024-10-06 | Stop reason: HOSPADM

## 2024-10-04 RX ORDER — NIFEDIPINE 30 MG/1
30 TABLET, EXTENDED RELEASE ORAL DAILY
Status: DISCONTINUED | OUTPATIENT
Start: 2024-10-04 | End: 2024-10-06 | Stop reason: DRUGHIGH

## 2024-10-04 RX ADMIN — ACETAMINOPHEN 650 MG: 325 TABLET, FILM COATED ORAL at 13:19

## 2024-10-04 RX ADMIN — SENNOSIDES 8.6 MG: 8.6 TABLET, FILM COATED ORAL at 09:01

## 2024-10-04 RX ADMIN — ACETAMINOPHEN 650 MG: 325 TABLET, FILM COATED ORAL at 23:33

## 2024-10-04 RX ADMIN — ONDANSETRON 4 MG: 2 INJECTION INTRAMUSCULAR; INTRAVENOUS at 02:00

## 2024-10-04 RX ADMIN — NIFEDIPINE 30 MG: 30 TABLET, FILM COATED, EXTENDED RELEASE ORAL at 09:01

## 2024-10-04 RX ADMIN — IBUPROFEN 600 MG: 600 TABLET, FILM COATED ORAL at 06:34

## 2024-10-04 RX ADMIN — IBUPROFEN 600 MG: 600 TABLET, FILM COATED ORAL at 13:19

## 2024-10-04 RX ADMIN — ACETAMINOPHEN 650 MG: 325 TABLET, FILM COATED ORAL at 06:34

## 2024-10-04 NOTE — LACTATION NOTE
This note was copied from a baby's chart.  Baby latched to the breast upon entering room, good latch with regular suck/swallow, instructed on breast compressions as needed, reviewed moist wound healing, lanolin provided, hand pump provided.     Encouraged family to call for assistance as needed.     [id number]

## 2024-10-04 NOTE — ANESTHESIA PROCEDURE NOTES
Epidural Block    Patient location during procedure: OB/L&D  Start time: 10/3/2024 9:03 PM  Staffing  Performed by: Sam Grover DO  Authorized by: Sam Grover DO    Preanesthetic Checklist  Completed: patient identified, IV checked, site marked, risks and benefits discussed, surgical consent, monitors and equipment checked, pre-op evaluation and timeout performed  Epidural  Patient position: sitting  Prep: Betadine  Sedation Level: no sedation  Patient monitoring: continuous pulse oximetry, heart rate and frequent blood pressure checks  Approach: midline  Location: lumbar, L3-4  Injection technique: JAJA air  Needle  Needle type: Tuohy   Needle gauge: 18 G  Catheter size: 20 G  Test dose: negativeropivacaine (NAROPIN) 0.2% injection 10 mL - Epidural   10 mL - 10/3/2024 9:08:00 PM  Assessment  Sensory level: T10  Number of attempts: 1negative aspiration for CSF, negative aspiration for heme and no paresthesia on injection  patient tolerated the procedure well with no immediate complications

## 2024-10-04 NOTE — CASE MANAGEMENT
Case Management Progress Note    Patient name Kerline Mcdonald  Location /-01 MRN [id number]  : 2000 Date 10/4/2024       LOS (days): 1  Geometric Mean LOS (GMLOS) (days):   Days to GMLOS:        OBJECTIVE:        Current admission status: Inpatient  Preferred Pharmacy:    [facility] [place] [address]  Phone: [phone] Fax: [phone]    Primary Care Provider: No primary care provider on file.    Primary Insurance:   Secondary Insurance:     PROGRESS NOTE:  Requested PATHS referral from [facility].

## 2024-10-04 NOTE — OB LABOR/OXYTOCIN SAFETY PROGRESS
Oxytocin Safety Progress Check Note - Kerline Mcdonald 23 y.o. female MRN: [id number]    Unit/Bed#: -01 Encounter: [id number]    Dose (david-units/min) Oxytocin: 12 david-units/min  Contraction Frequency (minutes): 3-5  Contraction Intensity: Moderate  Uterine Activity Characteristics: Irregular  Cervical Dilation: 5        Cervical Effacement: 70  Fetal Station: -2  Baseline Rate (FHR): 135 bpm  Fetal Heart Rate (FHT): 125 BPM  FHR Category: cat 1               Vital Signs:   Vitals:    10/03/24 2304   BP:    Pulse:    Resp:    Temp: 98.6 °F (37 °C)       Notes/comments:     SVE unchanged. Continue pitocin titration. Cat 1 tracing    Vane Mcallister DO 10/3/2024 11:20 PM

## 2024-10-04 NOTE — DISCHARGE SUMMARY
Discharge Summary - Kerline Mcdonald 24 y.o. female MRN: [id number]    Unit/Bed#: -01 Encounter: [id number]    Admission Date: 10/3/2024     Discharge Date: 10/6/2024    Patient Active Problem List   Diagnosis     (spontaneous vaginal delivery)    CHTN with super imposed preeclampsia without severe features    Nonimmune to hepatitis B virus    GBS bacteriuria    ASCUS pap    Maternal anemia    Encounter for induction of labor       OBGYN Practice: [facility] [place], [place] Course:   Kerline Mcdonald is a 23 y.o. [id number] 38w0d . She presented to labor and delivery for elevated blood pressures in clinic. She was diagnosed with cHTN with Superimposed preeclampsia without SF and IOL was recommended. Her pregnancy was complicated by cHTN, anemia on venofer infusions, late transfer of care from [facility], GBS positive status, hx of Preeclampsia in a prior pregnancy. On exam in triage she was noted to be closed/thick/high. She was admitted for IOL. She was induced with FB + pitocin. The FB came out and she was 5/70/-2. Amniotomy was performed at 2120 for  clear fluid. She progressed to complete and began pushing.      Delivery Findings:  Kerline delivered a viable female  on 10/4/2024 2:11 AM via Vaginal, Spontaneous . The delivery was uncomplicated.    Baby's Weight: 6lbs 10oz  Apgar scores: 8  and 9  at 1 and 5 minutes, respectively  Anesthesia: Epidural,   QBL: Non-Surgical QBL (mL): 142         was transferred to  nursery. Patient tolerated the procedure well and was transferred to recovery in stable condition.     Her post-partum course was remarkable for initiation of Procardia 30 mg daily on postpartum day 0 due to persistent mild range blood pressures. On post-partum day 2 this was titrated up to 60 mg of Procardia.  Patient was enrolled in home blood pressure monitoring program and was instructed to follow-up in the clinic in 1 week for blood pressure  check. Her post-partum pain was well controlled with oral analgesics. On day of discharge she was ambulating, voiding spontaneously, tolerating oral intake and hemodynamically stable. Mom's blood type is O positive and  Rhogam was not given.    She was discharged home on postpartum day #2 without complications. Patient was instructed to follow up with her OB as an outpatient and was given appropriate warnings to call doctor or provider if she develops signs of infection or uncontrolled pain.    Discharge Problem List by Issue:   CHTN with super imposed preeclampsia without severe features  Assessment & Plan   Hx of CHTN not on medication presented with Mild headache and swelling on admission, P:C noted to be 0.4 an she met criteria for super imposed pre-eclampsia  CBC, CMP wnl     Systolic (12hrs), Av , Min:131 , Max:149   Diastolic (12hrs), Av, Min:82, Max:90  Non sustained SSRBP during garcia balloon placement, did nt require acute treatment   Procardia 30mg XL initiated 10/04    Plan:   Uptitrating procardia to 60mg daily  Monitor pre-E severe features and consider start Magnesium if sustained severe blood pressures      *  (spontaneous vaginal delivery)  Assessment & Plan  Lochia WNL   Recovering well   Appropriate bowel and bladder function   Pain well controlled   Tolerating diet   Ambulating without issues   No lower extremity tenderness  GBS unknown   Rh positive    Contraception : nexplanon          Disposition: Home    Planned Readmission: No    Discharge Medications:   Please see AVS    Discharge instructions :   -Do not place anything (no partner, tampons or douche) in your vagina for 6 weeks.  -You may walk for exercise for the first 6 weeks then gradually return to your usual activities.   -Please do not drive for 1 week if you have no stitches and for 2 weeks if you have stitches or underwent a  delivery.    -You may take baths or shower per your preference.   -Please look at your  bust (breasts) in the mirror daily and call your doctor for redness or tenderness or increased warmth.   - If you have had a  section please look at your incision daily as well and call provider for increasing redness or steady drainage from the incision.   -Please call your doctor's office if temperature > 100.4*F or 38* C, worsening pain or a foul discharge.    Follow Up:  - Follow up in 1 weeks for postpartum visit/blood pressure check    Vane Mcallister,    Obstetrics & Gynecology PGY-II  10/06/24  6:17 PM

## 2024-10-04 NOTE — CASE MANAGEMENT
Case Management Progress Note    Patient name Kerline Mcdonald  Location /-01 MRN [id number]  : 2000 Date 10/4/2024       LOS (days): 1  Geometric Mean LOS (GMLOS) (days):   Days to GMLOS:        OBJECTIVE:        Current admission status: Inpatient  Preferred Pharmacy:    [facility] - [address] [place] [id number]  Phone: [phone] Fax: [phone]    Primary Care Provider: No primary care provider on file.    Primary Insurance: [employer] PENDING  Secondary Insurance:     PROGRESS NOTE:    CM conducted chart review and noted SDOH concerns.     Met with MOB and FOB at bedside utilizing  #[id number]. MOB/FOB reside with LINDSEY's sister, brother in law, nephews, and LINDSEY's other son. Family up to date on rent but slightly behind with utilities. They were agreeable to CM placing information for [employer] on AVS as they have PPL. MOB has WIC. They sometimes worry about food insecurity but deny current concern. Provided them with list of food batista. Inquired about whether they have all necessary baby supplies. LINDSEY reported his sister is working on getting car seat and crib. Family thought they had more time to get supplies but baby was delivered two weeks early. Parents confident that family will have items delivered prior to d/c. Baby will follow up with care at [facility]. Parents deny any other needs at this time.

## 2024-10-04 NOTE — ANESTHESIA POSTPROCEDURE EVALUATION
"Post-Op Assessment Note    CV Status:  Stable  Pain Score: 1    Pain management: adequate      Post-op block assessment: catheter intact and no complications   Mental Status:  Alert and awake   Hydration Status:  Euvolemic   PONV Controlled:  Controlled   Airway Patency:  Patent     Post Op Vitals Reviewed: Yes    No anethesia notable event occurred.    Staff: Anesthesiologist               BP      Temp      Pulse     Resp      SpO2      /72   Pulse 59   Temp 97.6 °F (36.4 °C) (Temporal)   Resp 18   Ht 4' 11\" (1.499 m)   Wt 67.7 kg (149 lb 3.2 oz)   LMP 01/12/2024 (Exact Date)   Breastfeeding Yes   BMI 30.13 kg/m²     "

## 2024-10-04 NOTE — LACTATION NOTE
This note was copied from a baby's chart.  CONSULT - LACTATION  Baby Girl (Modesta Mcdonald 0 days female MRN: [id number]    [facility] NURSERY Room / Bed: (N)/(N) Encounter: [id number]    Maternal Information     MOTHER:  Tamara,Liu  Maternal Age: 23 y.o.  OB History: # 1 - Date: 17, Sex: Male, Weight: None, GA: 40w0d, Type: Vaginal, Spontaneous, Apgar1: None, Apgar5: None, Living: Living, Birth Comments: None    # 2 - Date: 20, Sex: Male, Weight: None, GA: 40w0d, Type: Vaginal, Spontaneous, Apgar1: None, Apgar5: None, Living: Living, Birth Comments: Preeclampsia    # 3 - Date: , Sex: None, Weight: None, GA: None, Type: None, Apgar1: None, Apgar5: None, Living: None, Birth Comments: None    # 4 - Date: 10/04/24, Sex: Female, Weight: 3020 g (6 lb 10.5 oz), GA: 38w0d, Type: Vaginal, Spontaneous, Apgar1: 8, Apgar5: 9, Living: Living, Birth Comments: None   Previouse breast reduction surgery? No    Lactation history:   Has patient previously breast fed: Yes   How long had patient previously breast fed: over 1yr with first child, 7 months with the second child   Previous breast feeding complications: Other (Comment) (cracked nipples)     Past Surgical History:   Procedure Laterality Date    INGUINAL HERNIA REPAIR Left        Birth information:  YOB: 2025   Time of birth: 2:11 AM   Sex: female   Delivery type: Vaginal, Spontaneous   Birth Weight: 3020 g (6 lb 10.5 oz)   Percent of Weight Change: 0%     Gestational Age: 38w0d   [unfilled]    Assessment     Breast and nipple assessment: inverted nipple, small round breasts    [place] Assessment: sleepy    Feeding assessment:  no observed at this time  LATCH:  Latch: Repeated attempts, hold nipple in mouth, stimulate to suck   Audible Swallowing: A few with stimulation   Type of Nipple: Everted (After stimulation)   Comfort (Breast/Nipple): Soft/non-tender   Hold (Positioning): Partial  assist, teach one side, mother does other, staff holds   LATCH Score: 7          Feeding recommendations:  breast feed on demand    Met with Dyad. Provided  with Ready, Set, Baby booklet which contained information on:  Hand expression with access to QR codes to review hand expression.  Positioning and latch reviewed as well as showing images of other feeding positions.  Discussed the properties of a good latch in any position.   Feeding on cue and what that means for recognizing infant's hunger, s/s that baby is getting enough milk and some s/s that breastfeeding dyad may need further help  Skin to Skin contact and benefits to mom and baby  Avoidance of pacifiers for the first month discussed.   Gave information on common concerns, what to expect the first few weeks after delivery, preparing for other caregivers, and how partners can help. Resources for support also provided.    Liu states breastfeeding is going well, she denies latch issues.    Reviewed moist would healing for the left nipple.    Encouraged Key to call for latch check at the next feeding.     DC book at bedside, both books provided in Romanian.     [id number] used for duration of the consult.    Zoila Russell RN 10/4/2024 1:08 PM

## 2024-10-04 NOTE — OB LABOR/OXYTOCIN SAFETY PROGRESS
Oxytocin Safety Progress Check Note - Kerline Mcdonald 23 y.o. female MRN: [id number]    Unit/Bed#: -01 Encounter: [id number]    Dose (david-units/min) Oxytocin: 16 david-units/min  Contraction Frequency (minutes): 2-3  Contraction Intensity: Moderate  Uterine Activity Characteristics: Regular  Cervical Dilation: 5        Cervical Effacement: 70  Fetal Station: -2  Baseline Rate (FHR): 135 bpm  Fetal Heart Rate (FHT): 125 BPM  FHR Category: cat 2 for occasional variables, reassured by presence of accels and moderate variability               Vital Signs:   Vitals:    10/04/24 0150   BP: 146/84   Pulse: 69   Resp:    Temp:        Notes/comments:     Late entry due to patient care. SVE as above at 0030. Continue pitocin titration. Cat 2 for occasional variable decelerations that resolve with repositioning    Vane Mcallister DO 10/4/2024 1:55 AM

## 2024-10-04 NOTE — PLAN OF CARE
Problem: Knowledge Deficit  Goal: Verbalizes understanding of labor plan  Description: Assess patient/family/caregiver's baseline knowledge level and ability to understand information.  Provide education via patient/family/caregiver's preferred learning method at appropriate level of understanding.     1. Provide teaching at level of understanding.  2. Provide teaching via preferred learning method(s).  10/4/2024 0231 by Rach Monsivais RN  Outcome: Completed  10/3/2024 2010 by Rach Monsivais RN  Outcome: Progressing     Problem: Labor & Delivery  Goal: Manages discomfort  Description: Assess and monitor for signs and symptoms of discomfort.  Assess patient's pain level regularly and per hospital policy.  Administer medications as ordered. Support use of nonpharmacological methods to help control pain such as distraction, imagery, relaxation, and application of heat and cold.  Collaborate with interdisciplinary team and patient to determine appropriate pain management plan.    1. Include patient in decisions related to comfort.  2. Offer non-pharmacological pain management interventions.  3. Report ineffective pain management to physician.  10/4/2024 0231 by Rach Monsivais RN  Outcome: Completed  10/3/2024 2010 by Rach Monsivais RN  Outcome: Progressing  Goal: Patient vital signs are stable  Description: 1. Assess vital signs - vaginal delivery.  10/4/2024 0231 by Rach Monsivais RN  Outcome: Completed  10/3/2024 2010 by Rach Monsivais RN  Outcome: Progressing

## 2024-10-04 NOTE — L&D DELIVERY NOTE
Vaginal Delivery Summary - OB/GYN   Kerline Mcdonald 23 y.o. female MRN: [id number]  Unit/Bed#: -01 Encounter: [id number]    Pre-delivery Diagnosis:   Pregnancy at 38w0d  cHTN with SI Preeclampsia W/O SF  Anemia on venofer infusions  GBS positive status  Hx of preeclampsia in a prior pregnancy     Post-delivery Diagnosis: same, delivered    Procedure: Spontaneous Vaginal Delivery     Attending Physician: Dr. Torres  Resident Physician: Vane Mcallister DO, PGY-II      Anesthesia: Epidural    QBL: pending at time of dictation    Complications: none apparent    Specimens:   1. Arterial and venous cord gases  2. Cord blood  3. Segment of umbilical cord  4. Placenta to storage     Findings:  1. Viable female on 10/4/2024 at 0211, with APGARS of 8 and 9 at 1 and 5 minutes respectively. Weight pending at time of dictation for skin to skin bonding.  2. Spontaneous delivery of intact placenta at 0215  3. No lacerations    Gases:  Umbilical Artery  Recent Labs     10/04/24  0212   PHCART 7.321   BECART -3.2*       Umbilical Vein  Recent Labs     10/04/24  0212   PHCVEN 7.353   BECVEN -4.6*           Brief history and labor course:  Kerline Mcdonald is a 23 y.o. [id number] 38w0d . She presented to labor and delivery for elevated blood pressures in clinic. She was diagnosed with cHTN with Superimposed preeclampsia without SF and IOL was recommended. Her pregnancy was complicated by cHTN, anemia on venofer infusions, late transfer of care from [facility], GBS positive status, hx of Preeclampsia in a prior pregnancy. On exam in triage she was noted to be closed/thick/high. She was admitted for IOL. She was induced with FB + pitocin. The FB came out and she was 5/70/-2. Amniotomy was performed at 2120 for  clear fluid. She progressed to complete and began pushing.    Description of delivery:    Patient delivered a viable female , wt pending as mother is doing skin to skin bonding. The fetal vertex delivered TERENCE position  spontaneously. There was a single nuchal cord which was reduced. The left anterior shoulder delivered atraumatically with maternal expulsive forces and the assistance of gentle downward traction. The right posterior shoulder delivered with maternal expulsive forces and the assistance of gentle upward traction. The remainder of the fetus delivered spontaneously.        Upon delivery, the infant was placed on the mothers abdomen and the cord was doubly clamped and cut. Delayed cord clamping was achieved. The infant was noted to cry spontaneously and was moving all extremities appropriately. There was no evidence for injury. Awaiting nurse resuscitators evaluated the . Arterial and venous cord blood gases and cord blood was collected for analysis. These were promptly sent to the lab. In the immediate post-partum, active management of the 3rd stage of labor was performed with massage, the administration of 30 units of IV pitocin, and gentle traction on the umbilical cord. The placenta delivered spontaneously and was noted to have a marginal inserted 3 vessel cord.  The placenta was sent to storage     Laceration Repair  The vagina, cervix, perineum, and rectum were inspected and there was noted to be no lacerations.    At the conclusion of the procedure, all needle, sponge, and instrument counts were noted to be correct. Dr. Torres was present and participated in all key portions of the case.    Disposition:  The patient and the  both tolerated the procedure well and are recovering in labor and delivery room       Vane Mcallister DO  Obstetrics and Gynecology PGY-II  10/4/2024  2:27 AM

## 2024-10-04 NOTE — ANESTHESIA PREPROCEDURE EVALUATION
Procedure:  LABOR ANALGESIA    Relevant Problems   CARDIO   (+) CHTN      GYN   (+) 37 weeks gestation of pregnancy      HEMATOLOGY   (+) Maternal anemia        Physical Exam    Airway    Mallampati score: II         Dental       Cardiovascular  Rhythm: regular, Rate: normal    Pulmonary   Breath sounds clear to auscultation    Other Findings  post-pubertal.      Anesthesia Plan  ASA Score- 3     Anesthesia Type- epidural with ASA Monitors.         Additional Monitors:     Airway Plan:            Plan Factors-Exercise tolerance (METS): >4 METS.    Chart reviewed.   Existing labs reviewed. Patient summary reviewed.    Patient is not a current smoker.      Obstructive sleep apnea risk education given perioperatively.        Induction- intravenous.    Postoperative Plan-     Perioperative Resuscitation Plan - Level 1 - Full Code.       Informed Consent- Anesthetic plan and risks discussed with patient.

## 2024-10-04 NOTE — OB LABOR/OXYTOCIN SAFETY PROGRESS
Oxytocin Safety Progress Check Note - Kerline Mcdonald 23 y.o. female MRN: [id number]    Unit/Bed#: -01 Encounter: [id number]    Dose (david-units/min) Oxytocin: 4 dvaid-units/min  Contraction Frequency (minutes): 1-3  Contraction Intensity: Mild  Uterine Activity Characteristics: Irritability  Cervical Dilation: Closed        Cervical Effacement: 0  Fetal Station: -3  Baseline Rate (FHR): 130 bpm     FHR Category: cat 1               Vital Signs:   Vitals:    10/03/24 2042   BP: 154/98   Pulse: 67   Resp:    Temp:        Notes/comments:     FB expelled, patient feeling more discomfort, will perform SVE after epidural    Vane Mcallister DO 10/3/2024 8:55 PM

## 2024-10-04 NOTE — OB LABOR/OXYTOCIN SAFETY PROGRESS
Oxytocin Safety Progress Check Note - Kerline Mcdonald 23 y.o. female MRN: [id number]    Unit/Bed#: -01 Encounter: [id number]    Dose (david-units/min) Oxytocin: 6 david-units/min  Contraction Frequency (minutes): 1-3  Contraction Intensity: Mild  Uterine Activity Characteristics: Irritability  Cervical Dilation: 5        Cervical Effacement: 70  Fetal Station: -2  Baseline Rate (FHR): 130 bpm     FHR Category: cat 1               Vital Signs:   Vitals:    10/03/24 2042   BP: 154/98   Pulse: 67   Resp:    Temp:        Notes/comments:     SVE as above after FB and epidural. Patient ruptured for clear fluid. Cat 1 tracing    Vane DO Esvin 10/3/2024 9:24 PM

## 2024-10-05 PROBLEM — O09.299 HX OF PREECLAMPSIA, PRIOR PREGNANCY, CURRENTLY PREGNANT: Status: RESOLVED | Noted: 2024-07-25 | Resolved: 2024-10-05

## 2024-10-05 PROCEDURE — 99024 POSTOP FOLLOW-UP VISIT: CPT | Performed by: OBSTETRICS & GYNECOLOGY

## 2024-10-05 RX ORDER — NIFEDIPINE 30 MG/1
30 TABLET, EXTENDED RELEASE ORAL DAILY
Qty: 30 TABLET | Refills: 3 | Status: CANCELLED | OUTPATIENT
Start: 2024-10-05

## 2024-10-05 RX ADMIN — ACETAMINOPHEN 650 MG: 325 TABLET, FILM COATED ORAL at 21:18

## 2024-10-05 RX ADMIN — NIFEDIPINE 30 MG: 30 TABLET, FILM COATED, EXTENDED RELEASE ORAL at 08:41

## 2024-10-05 RX ADMIN — SENNOSIDES 8.6 MG: 8.6 TABLET, FILM COATED ORAL at 08:41

## 2024-10-05 NOTE — PLAN OF CARE

## 2024-10-05 NOTE — PROGRESS NOTES
Progress Note - OB/GYN   Name: Kerline Mcdonald 24 y.o. female I MRN: [id number]  Unit/Bed#: -01 I Date of Admission: 10/3/2024   Date of Service: 10/5/2024 I Hospital Day: 2     Progress Note - OB/GYN  Kerline Mcdonald 24 y.o. female MRN: [id number]  Unit/Bed#: -01 Encounter: [id number]    Assessment and Plan     Kerline Mcdonald is a patient of: [facility]. She is PPD# 1 s/p .  Recovering well and is stable        (spontaneous vaginal delivery)  Assessment & Plan  Lochia WNL   Recovering well   Appropriate bowel and bladder function   Pain well controlled   Tolerating diet   Ambulating without issues   No lower extremity tenderness  GBS unknown   Rh positive    Contraception : nexplanon     * CHTN with super imposed preeclampsia without severe features  Assessment & Plan   Hx of CHTN not on medication presented with Mild headache and swelling on admission, P:C noted to be 0.4 an she met criteria for super imposed pre-eclampsia  CBC, CMP wnl     Systolic (12hrs), Av , Min:137 , Max:154   Diastolic (12hrs), Av, Min:83, Max:93  Non sustained SSRBP during garcia balloon placement, did nt require acute treatment   Procardia 30mg XL initiated 10/04    Plan:   Consider uptitrating procardia 30 to 60mg daily if persistent BP on the 150's/90's today  Monitor pre-E severe features and consider start Magnesium if sustained severe blood pressures      Maternal anemia  Assessment & Plan  S/p venofer infusions,admission hgb 10.7    ASCUS pap  Assessment & Plan  Needs repeat pap in 1 year    GBS bacteriuria  Assessment & Plan  S/P PCN in labor    Nonimmune to hepatitis B virus  Assessment & Plan  Hep B Vaccine ordered        Disposition    - Anticipate discharge home on PPD# 1-2      Subjective/Objective     Chief Complaint: Postpartum State     Subjective:    Kerline Mcdonald is PPD#1 s/p . She has no current complaints.  Pain is well controlled.  Patient is  "currently voiding.  She is ambulating.  Patient is currently passing flatus and has had bowel movement. She is tolerating PO, and denies nausea or vomitting. Patient denies fever, chills, chest pain, shortness of breath, or calf tenderness. Lochia is normal. She is recovering well and is stable.       Vitals:   /93 Comment: Repeat BP RN notified  Pulse 73   Temp 98.1 °F (36.7 °C) (Oral)   Resp 20   Ht 4' 11\" (1.499 m)   Wt 67.7 kg (149 lb 3.2 oz)   LMP 01/12/2024 (Exact Date)   SpO2 97%   Breastfeeding Yes   BMI 30.13 kg/m²       Intake/Output Summary (Last 24 hours) at 10/5/2024 0539  Last data filed at 10/4/2024 0845  Gross per 24 hour   Intake --   Output 1600 ml   Net -1600 ml       Invasive Devices       Peripheral Intravenous Line  Duration             Peripheral IV 10/03/24 Dorsal (posterior);Left Forearm 1 day                    Physical Exam:   GEN: Kerline Mcdonald appears well, alert and oriented x 3, pleasant and cooperative   CARDIO: RRR, no murmurs or rubs  RESP:  CTAB, no wheezes or rales  ABDOMEN: soft, no tenderness, no distention, fundus @ 2cm below u   EXTREMITIES: SCDs on, non tender, no erythema      Labs:     Hemoglobin   Date Value Ref Range Status   10/03/2024 10.7 (L) 11.5 - 15.4 g/dL Final   08/15/2024 10.2 (L) 11.5 - 15.4 g/dL Final     WBC   Date Value Ref Range Status   10/03/2024 9.61 4.31 - 10.16 Thousand/uL Final   08/15/2024 13.51 (H) 4.31 - 10.16 Thousand/uL Final     Platelets   Date Value Ref Range Status   10/03/2024 245 149 - 390 Thousands/uL Final   08/15/2024 280 149 - 390 Thousands/uL Final     Creatinine   Date Value Ref Range Status   10/03/2024 0.53 (L) 0.60 - 1.30 mg/dL Final     Comment:     Standardized to IDMS reference method   08/15/2024 0.56 (L) 0.60 - 1.30 mg/dL Final     Comment:     Standardized to IDMS reference method     AST   Date Value Ref Range Status   10/03/2024 24 13 - 39 U/L Final   08/15/2024 15 13 - 39 U/L Final     ALT   Date Value Ref " Range Status   10/03/2024 14 7 - 52 U/L Final     Comment:     Specimen collection should occur prior to Sulfasalazine administration due to the potential for falsely depressed results.    08/15/2024 7 7 - 52 U/L Final     Comment:     Specimen collection should occur prior to Sulfasalazine administration due to the potential for falsely depressed results.           Amparo Lindsay MD  10/5/2024  5:39 AM

## 2024-10-05 NOTE — ASSESSMENT & PLAN NOTE
Hx of CHTN not on medication presented with Mild headache and swelling on admission, P:C noted to be 0.4 an she met criteria for super imposed pre-eclampsia  CBC, CMP wnl     Systolic (12hrs), Av , Min:137 , Max:154   Diastolic (12hrs), Av, Min:83, Max:93  Non sustained SSRBP during garcia balloon placement, did nt require acute treatment   Procardia 30mg XL initiated 10/04    Plan:   Consider uptitrating procardia 30 to 60mg daily if persistent BP on the 150's/90's today  Monitor pre-E severe features and consider start Magnesium if sustained severe blood pressures

## 2024-10-05 NOTE — LACTATION NOTE
This note was copied from a baby's chart.  Follow up Lactation: Mom has baby on the right breast in a cradle hold. Mom complains of nipple pain.     : Devin #[id number]    Discussion of General Lactation Issues: painful latch      Interval Breastfeeding History:    Frequency of breast feeding: every 2-3 hrs. Nipples are in pain and mom complains of pain throughout the feeding  Does mother feel breastfeeding is effective: If no, explain: painful latch  Does infant appear satisfied after nursing:If no, explain: wants to breast feed frequently      Mother Assessment:  Breast: small, firm breasts with dark, areolas. Breasts are conical in shape  Nipple Assessment in General: bilateral large everted nipples with visible short shank and bilateral crease on nipple face from 9-3. Crease is deep bilaterally and everts to red, sensitive skin  Mother's Awareness of Feeding Cues                 Recognizes: Yes                  Verbalizes: Yes  Support System: FOB  History of Breastfeeding: breast fed 2 older children 1 yr. And 7 mo.  Changes/Stressors/Violence: painful latch  Concerns/Goals: wants to excl. Breast feed without pain      Infant Assessment:  Behaviors: Fussy                            Mouth:  No lesions; wide gape; upper lip blister; tongue elevates to mid-posterior- visible heart-shaped tip; visible frenulum that is thin, stretchy in appearance      Latch Assessment:  Efficiency: cradle hold on the right breast              Lips Flanged: No              Depth of latch: shallow              Audible Swallow: No              Visible Milk: No              Wide Open/ Asymmetrical: No              Suck Swallow Cycle: Breathing: yes, Coordinated: some  Nipple Assessment after latch: Red , Swollen , or at base of nipple  Latch Problems: mis-aligned; cheeks not touching the breast - mom pulls breast away from the baby's nose;     Positional Assessment:  Infant's Ergonomics/Body               Body Alignment: No       "         Head Supported: Yes               Close to Mom's body/ Lifted/ Supported: No               Mom's Ergonomics/Body: No                           Supported: No                           Sitting Back: No                           Brings Baby to her breast: Yes  Positioning Problems: baby is angled away from the breast in a semi-prone position; swaddled; ; mom has no back support, shoulders are hunched, no arm support - baby's hips are on mom's lap    [place] Latch After Lactation Education/Consult:  Efficiency: cross cradle to latch on R and L breast, then supported in cradle hold              Lips Flanged: Yes, when baby demonstrates fatigue, demonstrated with teach-back how to flange upper/lower lip              Depth of latch: deeper with snug hold at the breast.              Audible Swallow: Yes              Visible Milk: Yes, with HE after unlatched              Wide Open/ Asymmetrical: Yes, with demonstration on how to achieve a deeper latch              Suck Swallow Cycle: Breathing: yes, Coordinated: yes  Nipple Assessment after latch: Normal: elongated/eraser, no discoloration and no damage noted. - center of nipple face is everted; lanolin use after feeding. Ed. On wet wound care, lanolin use, breast shells and enc. Hand pumping prior to latch to constance the dimpled center.  Latch Problems: With demonstration and teach back, Cross cradle to hold and to create alignment and a wider, deeper latch. Mom denies any new pain. Mom states old pain due to previous shallow latches. Demonstration of breast compressions to assist with milk flow and breast emptying.     Enc. To call lactation for next feeding.     Demonstration on use of hand pump prior to latching and if feeding is to painful.     Education on creating a snug hold of your infant to the breast by verifying the infant's cheek is touching the breast, your infant's chin is deep into the breast tissue, your infant's arms are \"hugging\" the breast, and " your infant's lips are flanged on the areola. Bring infant to the breast, not your breast to the infant. Latch should feel like a tugging sensation on the nipple.      Position After Lactation Education/Consult:  Infant's Ergonomics/Body: L/R cross cradle to latch and cradle to hold               Body Alignment: Yes, with demonstration on un-swaddling, ear, shoulder, hip, alignment, snug hold and belly to belly.               Head Supported: Yes, enc. Support of lower mandible and compression between the shoulder blades to keep baby close. Ed. On how baby breathes at the breast.    All babies are born to breastfeed due to upturned nose and flared nostrils. Mom will always see tip of nose. Babies will unlatch when they can't breathe.                  Close to Mom's body/ Lifted/ Supported: Yes, belly to belly; enc. Mo to allow baby's legs to dangle and her elbow to tuck in baby's hips/bottom so baby is belly to belly               Mom's Ergonomics/Body: Yes, enc. Using bed and pillows for lower back support; pillows used to support baby and mom's arms up to the breast                           Supported: Yes, with pillows under her arms                           Sitting Back: Yes, pillows in lower lumbar                           Brings Baby to her breast: Yes, enc. Chin into the mid-section of the chest, nipple to nose, and wait for wide mouth. Bring baby to the breast, not breast to baby. No hunching shoulders.   Positioning Problems: use cross cradle to latch and cradle to support. Enc. Snug hold of baby and breast compressions. Enc. To offer both breasts at every feeding. Enc. To look for signs of satiation and timing of feeds.     Ed. And demonstration with teach back  On use of hand expression and hand pump to pull dimpled center out prior to latching.     Equipment:    Manual Pump: Yes, no ins.      Shells            Type: Ameda            Frequency of use: provided to mom     Wound Care: No, no visible wounds      Lanolin: Yes, enc. To use in between feeds. Ed on how it is safe for baby.     Handouts:   Nipple pain    Feeding Plan:     Mom's nipples are everted with bilateral crease on nipple face from 9-3; short shank noted. Education on ways to elongate the nipple center: Hand expression, Latch assist, breast shells, supple cups, manual and electric pump stimulation.     Education on positioning and alignment. Mom is encouraged to:     - Bring baby up to the breast (use of pillows to elevate so baby's torso is against mom's breasts)   - Skin to skin for feedings with top hand exposed to show signs of satiation   - Chin deep into breast tissue (make baby look up to the nipple)   - nose aligned to the nipple   -Wait for wide gape, drag chin on the breast so nipple is aimed at the upper, back palate  - Cheek should be touching breast   - Deep, firm hold of baby with ear, shoulder, hip alignment    Feeding Plan     1. Meet early feeding cues  2. Use hand expression, hand pump, and nipple rolling techniques to assist with nipple center everting.  3. Use massage, warmth, to stimulate breasts  4. Use pillows to bring baby to the breast (shoulders back, lower back support). Make sure you can see the latch.   5. Bring baby to breast skin to skin  6. Have baby's chest against mom's torso. Baby's chin should be deeply into the breast, and nose should touch the nipple. This position will  assist with deeper latch**  7. Place opposite hand under the breast and grab the breast like a taco. Your thumb should be in front of the baby's nose and behind the areola. Move baby not breast, and bring baby to breast when mouth is wide and deep latch is achieved. May then take hand holding the breast and support baby to the breast in a cradle hold.  8. Allow baby to stay on the breast for up to 30 min.   9. Look for signs of satiation. If baby is not satiated and latch was painful, use expressed milk via paced bottle feeding method. Use hand  pumps to express the milk.  10. Place baby on opposite breast after bottle feeding for non-nutritive suck and to create supply.  11. Start next feeding on the breast the feed finished (2nd breast).    (Scan QR code for [employer] - positions)    [employer] LATCH VIDEO    [email or web address]        If baby does not meet diaper output  1. If baby does not suck with stimulation, becomes fussy, or un-latches, use breast pump to express milk.   2.  Feed expressed milk via paced bottle feeding method. Review Milkmob on youtube or scan QR code for MilkMob video  3. Bring baby back to opposite breast for non-nutritive suck and skin to skin  4. Pump after each feed to stimulate breasts and have expressed milk for next feed       Milk Mob     10. Move baby to the opposite breast and follow steps 1-8 to latch deeply.   11. Pump after each feed to stimulate breasts and have expressed milk for next feeding. Do not pump for more than 10 min.     To help your nipples heal, in addition to paying close attention to latch and positioning, moist would healing is recommended. Apply organic coconut or olive oil, or lanolin/nipple cream to the nipple and cover with a small piece of wax/parcment paper. Remove before feeding and apply new cream and paper after each feeding. apply protective ointment after feeding or pumping and cover with an occlusive dressing.    Reviewed early signs of hunger, including tensing of hands and shoulders - no need to wait for open eyes.  Crying is a late hunger sign.  If baby is crying, soothe baby first and then attempt to latch.  Reviewed normal sucking patterns: transition from stimulation to nutritive to release or non-nutritive. The goal is to see and hear lots of swallowing.  Reviewed normal nursing pattern: infant could latch on one breast up to 30 minutes or until releases on own. Signs of satiation is open  "hand with fingers that do not grab your finger.  Discussed difference in sensation of non-nutritive v nutritive sucking    Education on creating a snug hold of your infant to the breast by verifying the infant's cheek is touching the breast, your infant's chin is deep into the breast tissue, your infant's arms are \"hugging\" the breast, and your infant's lips are flanged on the areola. Bring infant to the breast, not your breast to the infant. Latch should feel like a tugging sensation on the nipple.    Nurse on demand: when baby gives hunger cues; when your breasts feel full, or at least every 3 hours during the day and every 5 hours at night counting from the beginning of one feeding to the beginning of the next; which ever comes first. When sucking and swallowing slow, gently compress the breast to restart flow. If active suck-swallow does not restart, gently remove the baby and offer the other breast; offering up to \"four\" breasts per feeding.    "

## 2024-10-05 NOTE — NURSING NOTE
Utilized interpretor Grisell ([id number]) to review plan of care for mom and baby with patient. Reinforced waking baby every 3-4 hours for feeds. Encouraged and answered questions. Patient verbalized understanding.

## 2024-10-06 VITALS
WEIGHT: 149.2 LBS | DIASTOLIC BLOOD PRESSURE: 82 MMHG | HEIGHT: 59 IN | BODY MASS INDEX: 30.08 KG/M2 | OXYGEN SATURATION: 100 % | HEART RATE: 101 BPM | SYSTOLIC BLOOD PRESSURE: 133 MMHG | TEMPERATURE: 98.4 F | RESPIRATION RATE: 16 BRPM

## 2024-10-06 PROCEDURE — 99024 POSTOP FOLLOW-UP VISIT: CPT | Performed by: OBSTETRICS & GYNECOLOGY

## 2024-10-06 PROCEDURE — NC001 PR NO CHARGE: Performed by: OBSTETRICS & GYNECOLOGY

## 2024-10-06 RX ORDER — IBUPROFEN 600 MG/1
600 TABLET, FILM COATED ORAL EVERY 6 HOURS SCHEDULED
Start: 2024-10-06 | End: 2024-10-09

## 2024-10-06 RX ORDER — ACETAMINOPHEN 325 MG/1
975 TABLET ORAL EVERY 6 HOURS PRN
Start: 2024-10-06

## 2024-10-06 RX ORDER — NIFEDIPINE 30 MG/1
60 TABLET, EXTENDED RELEASE ORAL DAILY
Status: DISCONTINUED | OUTPATIENT
Start: 2024-10-07 | End: 2024-10-06 | Stop reason: HOSPADM

## 2024-10-06 RX ORDER — NIFEDIPINE 30 MG/1
30 TABLET, EXTENDED RELEASE ORAL ONCE
Status: COMPLETED | OUTPATIENT
Start: 2024-10-06 | End: 2024-10-06

## 2024-10-06 RX ORDER — BENZOCAINE/MENTHOL 6 MG-10 MG
1 LOZENGE MUCOUS MEMBRANE DAILY PRN
Start: 2024-10-06

## 2024-10-06 RX ORDER — NIFEDIPINE 60 MG/1
60 TABLET, EXTENDED RELEASE ORAL DAILY
Qty: 60 TABLET | Refills: 0 | Status: SHIPPED | OUTPATIENT
Start: 2024-10-06 | End: 2024-10-08 | Stop reason: SDUPTHER

## 2024-10-06 RX ADMIN — NIFEDIPINE 30 MG: 30 TABLET, FILM COATED, EXTENDED RELEASE ORAL at 10:44

## 2024-10-06 RX ADMIN — ACETAMINOPHEN 650 MG: 325 TABLET, FILM COATED ORAL at 04:50

## 2024-10-06 RX ADMIN — NIFEDIPINE 30 MG: 30 TABLET, FILM COATED, EXTENDED RELEASE ORAL at 07:36

## 2024-10-06 RX ADMIN — SENNOSIDES 8.6 MG: 8.6 TABLET, FILM COATED ORAL at 07:36

## 2024-10-06 NOTE — PLAN OF CARE

## 2024-10-06 NOTE — PROGRESS NOTES
Progress Note - OB/GYN  Kerline Mcdonald 24 y.o. female MRN: [id number]  Unit/Bed#: -01 Encounter: [id number]    Assessment and Plan     Kerline Mcdonald is a patient of: [facility]. She is PPD# 2 s/p  spontaneous vaginal delivery  Recovering well and is stable       Nonimmune to hepatitis B virus  Assessment & Plan  Hep B Vaccine ordered     (spontaneous vaginal delivery)  Assessment & Plan  Lochia WNL   Recovering well   Appropriate bowel and bladder function   Pain well controlled   Tolerating diet   Ambulating without issues   No lower extremity tenderness  GBS unknown   Rh positive    Contraception : nexplanon     * CHTN with super imposed preeclampsia without severe features  Assessment & Plan   Hx of CHTN not on medication presented with Mild headache and swelling on admission, P:C noted to be 0.4 an she met criteria for super imposed pre-eclampsia  CBC, CMP wnl     Systolic (12hrs), Av , Min:132 , Max:148   Diastolic (12hrs), Av, Min:85, Max:91  Non sustained SSRBP during garcia balloon placement, did nt require acute treatment   Procardia 30mg XL initiated 10/04    Plan:   Consider uptitrating procardia 30 to 60mg daily if persistent BP on the 150's/90's today  Monitor pre-E severe features and consider start Magnesium if sustained severe blood pressures          Disposition    - Anticipate discharge home on PPD# 2      Subjective/Objective     Chief Complaint: Postpartum State     Subjective:    Kerline Mcdonald is PPD#2 s/p  spontaneous vaginal delivery. She states she currently has a mild headache.  Will give Tylenol.  Pain is well controlled.  Patient is currently voiding.  She is ambulating.  Patient is currently passing flatus and has had no bowel movement. She is tolerating PO, and denies nausea or vomitting. Patient denies fever, chills, chest pain, shortness of breath, or calf tenderness. Lochia is normal. She is  Breastfeeding. She is recovering  "well and is stable. She desires discharge today if she and baby are both cleared.         Vitals:   /91 (BP Location: Right arm) Comment: RN Notified  Pulse 71   Temp 98.1 °F (36.7 °C) (Oral)   Resp 16   Ht 4' 11\" (1.499 m)   Wt 67.7 kg (149 lb 3.2 oz)   LMP 01/12/2024 (Exact Date)   SpO2 96%   Breastfeeding Yes   BMI 30.13 kg/m²     No intake or output data in the 24 hours ending 10/06/24 0126    Invasive Devices       Peripheral Intravenous Line  Duration             Peripheral IV 10/03/24 Dorsal (posterior);Left Forearm 2 days                    Physical Exam:   GEN: Kerline Mcdonald appears well, alert and oriented x 3, pleasant and cooperative   CARDIO: RRR, no murmurs or rubs  RESP:  CTAB, no wheezes or rales  ABDOMEN: soft, no tenderness, no distention, fundus @ U-2  EXTREMITIES: non tender, no erythema  Labs:     Hemoglobin   Date Value Ref Range Status   10/03/2024 10.7 (L) 11.5 - 15.4 g/dL Final   08/15/2024 10.2 (L) 11.5 - 15.4 g/dL Final     WBC   Date Value Ref Range Status   10/03/2024 9.61 4.31 - 10.16 Thousand/uL Final   08/15/2024 13.51 (H) 4.31 - 10.16 Thousand/uL Final     Platelets   Date Value Ref Range Status   10/03/2024 245 149 - 390 Thousands/uL Final   08/15/2024 280 149 - 390 Thousands/uL Final     Creatinine   Date Value Ref Range Status   10/03/2024 0.53 (L) 0.60 - 1.30 mg/dL Final     Comment:     Standardized to IDMS reference method   08/15/2024 0.56 (L) 0.60 - 1.30 mg/dL Final     Comment:     Standardized to IDMS reference method     AST   Date Value Ref Range Status   10/03/2024 24 13 - 39 U/L Final   08/15/2024 15 13 - 39 U/L Final     ALT   Date Value Ref Range Status   10/03/2024 14 7 - 52 U/L Final     Comment:     Specimen collection should occur prior to Sulfasalazine administration due to the potential for falsely depressed results.    08/15/2024 7 7 - 52 U/L Final     Comment:     Specimen collection should occur prior to Sulfasalazine administration due to " the potential for falsely depressed results.           Carmen Wagner MD  10/6/2024  1:26 AM

## 2024-10-06 NOTE — NURSING NOTE
[employer] Home Blood Pressure Monitoring system set up with pt. [facility] nurse confirmed received information.     HR was 111 on [employer]. Manual HR repeated was 102. Physicians and [facility] nurse aware.

## 2024-10-06 NOTE — ASSESSMENT & PLAN NOTE
Hx of CHTN not on medication presented with Mild headache and swelling on admission, P:C noted to be 0.4 an she met criteria for super imposed pre-eclampsia  CBC, CMP wnl     Systolic (12hrs), Av , Min:132 , Max:148   Diastolic (12hrs), Av, Min:85, Max:91  Non sustained SSRBP during garcia balloon placement, did nt require acute treatment   Procardia 30mg XL initiated 10/04    Plan:   Consider uptitrating procardia 30 to 60mg daily if persistent BP on the 150's/90's today  Monitor pre-E severe features and consider start Magnesium if sustained severe blood pressures

## 2024-10-06 NOTE — PLAN OF CARE
Problem: POSTPARTUM  Goal: Experiences normal postpartum course  Description: INTERVENTIONS:  - Monitor maternal vital signs  - Assess uterine involution and lochia  Outcome: Completed  Goal: Appropriate maternal -  bonding  Description: INTERVENTIONS:  - Identify family support  - Assess for appropriate maternal/infant bonding   -Encourage maternal/infant bonding opportunities  - Referral to  or  as needed  Outcome: Completed  Goal: Establishment of infant feeding pattern  Description: INTERVENTIONS:  - Assess breast/bottle feeding  - Refer to lactation as needed  Outcome: Completed  Goal: Incision(s), wounds(s) or drain site(s) healing without S/S of infection  Description: INTERVENTIONS  - Assess and document dressing, incision, wound bed, drain sites and surrounding tissue  - Provide patient and family education  Outcome: Completed     Problem: PAIN - ADULT  Goal: Verbalizes/displays adequate comfort level or baseline comfort level  Description: Interventions:  - Encourage patient to monitor pain and request assistance  - Assess pain using appropriate pain scale  - Administer analgesics based on type and severity of pain and evaluate response  - Implement non-pharmacological measures as appropriate and evaluate response  - Consider cultural and social influences on pain and pain management  - Notify physician/advanced practitioner if interventions unsuccessful or patient reports new pain  Outcome: Completed     Problem: INFECTION - ADULT  Goal: Absence or prevention of progression during hospitalization  Description: INTERVENTIONS:  - Assess and monitor for signs and symptoms of infection  - Monitor lab/diagnostic results  - Monitor all insertion sites, i.e. indwelling lines, tubes, and drains  - Monitor endotracheal if appropriate and nasal secretions for changes in amount and color  - [place] appropriate cooling/warming therapies per order  - Administer medications as  ordered  - Instruct and encourage patient and family to use good hand hygiene technique  - Identify and instruct in appropriate isolation precautions for identified infection/condition  Outcome: Completed  Goal: Absence of fever/infection during neutropenic period  Description: INTERVENTIONS:  - Monitor WBC    Outcome: Completed     Problem: SAFETY ADULT  Goal: Patient will remain free of falls  Description: INTERVENTIONS:  - Educate patient/family on patient safety including physical limitations  - Instruct patient to call for assistance with activity   - Consult OT/PT to assist with strengthening/mobility   - Keep Call bell within reach  - Keep bed low and locked with side rails adjusted as appropriate  - Keep care items and personal belongings within reach  - Initiate and maintain comfort rounds  - Make Fall Risk Sign visible to staff  - Apply yellow socks and bracelet for high fall risk patients  - Consider moving patient to room near nurses station  Outcome: Completed  Goal: Maintain or return to baseline ADL function  Description: INTERVENTIONS:  -  Assess patient's ability to carry out ADLs; assess patient's baseline for ADL function and identify physical deficits which impact ability to perform ADLs (bathing, care of mouth/teeth, toileting, grooming, dressing, etc.)  - Assess/evaluate cause of self-care deficits   - Assess range of motion  - Assess patient's mobility; develop plan if impaired  - Assess patient's need for assistive devices and provide as appropriate  - Encourage maximum independence but intervene and supervise when necessary  - Involve family in performance of ADLs  - Assess for home care needs following discharge   - Consider OT consult to assist with ADL evaluation and planning for discharge  - Provide patient education as appropriate  Outcome: Completed  Goal: Maintains/Returns to pre admission functional level  Description: INTERVENTIONS:  - Perform AM-PAC 6 Click Basic Mobility/ Daily  Activity assessment daily.  - Set and communicate daily mobility goal to care team and patient/family/caregiver.   - Collaborate with rehabilitation services on mobility goals if consulted  - Out of bed for toileting  - Record patient progress and toleration of activity level   Outcome: Completed     Problem: Knowledge Deficit  Goal: Patient/family/caregiver demonstrates understanding of disease process, treatment plan, medications, and discharge instructions  Description: Complete learning assessment and assess knowledge base.  Interventions:  - Provide teaching at level of understanding  - Provide teaching via preferred learning methods  Outcome: Completed     Problem: DISCHARGE PLANNING  Goal: Discharge to home or other facility with appropriate resources  Description: INTERVENTIONS:  - Identify barriers to discharge w/patient and caregiver  - Arrange for needed discharge resources and transportation as appropriate  - Identify discharge learning needs (meds, wound care, etc.)  - Arrange for interpretive services to assist at discharge as needed  - Refer to Case Management Department for coordinating discharge planning if the patient needs post-hospital services based on physician/advanced practitioner order or complex needs related to functional status, cognitive ability, or social support system  Outcome: Completed

## 2024-10-06 NOTE — ASSESSMENT & PLAN NOTE
Hx of CHTN not on medication presented with Mild headache and swelling on admission, P:C noted to be 0.4 an she met criteria for super imposed pre-eclampsia  CBC, CMP wnl     Systolic (12hrs), Av , Min:131 , Max:149   Diastolic (12hrs), Av, Min:82, Max:90  Non sustained SSRBP during garcia balloon placement, did nt require acute treatment   Procardia 30mg XL initiated 10/04    Plan:   Uptitrating procardia to 60mg daily  Monitor pre-E severe features and consider start Magnesium if sustained severe blood pressures

## 2024-10-06 NOTE — LACTATION NOTE
This note was copied from a baby's chart.     10/06/24 1200   Lactation Consultation   Reason for Consult 20 m;10 minute   Risk Factors Language barrier   Breasts/Nipples   Left Breast Firm   Right Breast Firm   Intervention Cold pack;Hand expression;Breast pump   Breastfeeding Status Yes   Patient Follow-Up   Lactation Consult Status 2   Follow-Up Type Inpatient;Call as needed   Other OB Lactation Documentation    Additional Problem Noted baby sleeping with fullness cues noted. Liu uses breast pump for breast fullness. Breasts firm. cold pack given with instructions for 10 to 15 minutes application. Handouts in Kenyan     Encouraged parents to call for assistance, questions, and concerns about breastfeeding.

## 2024-10-06 NOTE — PLAN OF CARE

## 2024-10-07 ENCOUNTER — TELEPHONE (OUTPATIENT)
Dept: OTHER | Facility: HOSPITAL | Age: 24
End: 2024-10-07

## 2024-10-07 ENCOUNTER — PATIENT OUTREACH (OUTPATIENT)
Dept: OBGYN CLINIC | Facility: CLINIC | Age: 24
End: 2024-10-07

## 2024-10-07 ENCOUNTER — TELEPHONE (OUTPATIENT)
Facility: HOSPITAL | Age: 24
End: 2024-10-07

## 2024-10-07 NOTE — PROGRESS NOTES
JESICA HUTCHINS spoke with 23 y/o-S- P3- Tanzanian speaking woman to adress her needs. JESICA HUTCHINS introduced self and disused the reason for the call. Pt relocated form [facility] 4 months ago. Pt delivered 10/4 and was on her way home. Pt resides with fob, her 7 y/o son, baby, His sister and sister's family. Pt's second son resides in [place] with his dad. Pt reported pregnancy was intended and received pre chantal care in [facility]. Pt is awaiting for WIC appointment. Pt does have transportation issues.    Pt denies any usage of drug, alcohol or smoking. No MH or Docimastic Violence Hx. Pt inquired about assistance with medical bills and was educated about Medical Assistance application via [employer]. Pt was encouraged to follow up with them. Pt not eligible for SNAP and was informed about food batista programs. Information for local food batista was provided via [employer] help.    Pt reported her utilities bills are behind but she does not have much information. JESICA HUTCHINS Advice Pt to talk with her NICHOLAS and to call JESICA HUTCHINS with the information to look for resources. Pt agreeable. Pt denies other needs att his time. Pt was encouraged to call JESICA JOYNER at any time needed.

## 2024-10-07 NOTE — TELEPHONE ENCOUNTER
Remote BP Monitoring: HIGH BP ALERT 1618 151/100 . Patient contacted to resubmit VS, denied having any symptoms at this time. Patient currently in hospital for baby laboratory services and picking up BP cuff that she had left behind. Will resubmit VS once home and relaxed. Pending VS at this time.

## 2024-10-07 NOTE — TELEPHONE ENCOUNTER
Alexa is part of postpartum home BP monitoring program who reported leaving her BP monitor at the hospital. Encouraged to  BP monitor and start checking BP twice a day as recommended. Reported on her way to an appointment for her baby and then will try to  BP monitor. Denies any symptoms and reported taking Procardia. Key to follow up with her OBGYN.

## 2024-10-08 ENCOUNTER — TELEPHONE (OUTPATIENT)
Dept: OBGYN CLINIC | Facility: CLINIC | Age: 24
End: 2024-10-08

## 2024-10-08 DIAGNOSIS — O10.919 CHRONIC HYPERTENSION AFFECTING PREGNANCY: ICD-10-CM

## 2024-10-08 RX ORDER — NIFEDIPINE 60 MG/1
60 TABLET, EXTENDED RELEASE ORAL DAILY
Qty: 60 TABLET | Refills: 0 | Status: SHIPPED | OUTPATIENT
Start: 2024-10-08

## 2024-10-08 NOTE — TELEPHONE ENCOUNTER
Patient called stating she never received her blood pressure medication. Called back after resending pharmacy [employer] 60[facility]. Called pharmacy and they confirmed she received prescription.

## 2024-10-08 NOTE — TELEPHONE ENCOUNTER
Patient went to  NIFEdipine (PROCARDIA XL) 60 mg 24 hr tablet at   [facility]    [address] [place] [address], [place] [id number]  Phone: [phone]  Fax: [phone]   Per pt pharmacy never received script.

## 2024-10-10 ENCOUNTER — TELEPHONE (OUTPATIENT)
Dept: OBGYN CLINIC | Facility: CLINIC | Age: 24
End: 2024-10-10

## 2024-10-10 NOTE — TELEPHONE ENCOUNTER
Patient has been noncompliant in BP monitoring program. Has not submitted blood pressure readings since 10/8.  Called to remind patient to submit readings. Patient's  reports baby is hospitalized and patient has been staying at the hospital with baby. Therefore, unable to submit blood pressures. Parents unsure of when baby will be getting discharged. [facility] team notified.

## 2024-10-11 ENCOUNTER — TELEPHONE (OUTPATIENT)
Dept: OTHER | Facility: HOSPITAL | Age: 24
End: 2024-10-11

## 2024-10-11 ENCOUNTER — TELEPHONE (OUTPATIENT)
Dept: OBGYN CLINIC | Facility: CLINIC | Age: 24
End: 2024-10-11

## 2024-10-11 NOTE — TELEPHONE ENCOUNTER
Pt submitted /79 and  at 1332. Pt contacted. Pt denied any SOB, chest pain, dizziness, and palpitations. Pt states she does have a headache that started recently and it is a 4 out of 10 pain level. Pt asked to resubmit BP and HR in 15 minutes. Resubmission of /63 and  at 1538. On-call provider Teresa Carlson MD made aware.

## 2024-10-11 NOTE — TELEPHONE ENCOUNTER
"[facility] pharmacy called and left voicemail:    \"Hi this is a [facility] pharmacy calling. I'm trying to rectify a prescription for a patient. It was sent on the 8th. The patient's name is first name CLAUDIO. Last name is B like ashleigh GOMES. Date of birth 2000. It was sent over by [facility] with the last name KEVIN. Unfortunately, I'm not able to locate a [facility] ID for her and it will not go through the process of filling the prescription for this patient. If you could please give us a call back or either send a new script with a different provider, I'd appreciate it. Our phone number here is [phone]. Thank you.\"     Please call [phone] with info that has been requested.     "

## 2024-10-12 LAB — PLACENTA IN STORAGE: NORMAL

## 2024-10-14 ENCOUNTER — TELEPHONE (OUTPATIENT)
Dept: OBGYN CLINIC | Facility: CLINIC | Age: 24
End: 2024-10-14

## 2024-10-14 NOTE — TELEPHONE ENCOUNTER
Patient called to schedule an appointment to schedule an appointment for PP BP check. Offered appointment for 10/15/24, patient declined stated she can not make it this week she can come in next week. Patient has been scheduled for 10/21/24.

## 2024-10-15 ENCOUNTER — TELEPHONE (OUTPATIENT)
Dept: OBGYN CLINIC | Facility: MEDICAL CENTER | Age: 24
End: 2024-10-15

## 2024-10-15 NOTE — TELEPHONE ENCOUNTER
Patient has been noncompliant in BP monitoring program. Patient has not submitted blood pressure readings since 10/12. Called to remind patient to submit readings.  Spoke to patient.  Reviewed instructions for submitting readings. Verbalized understanding and will submit BP readings.

## 2024-10-21 ENCOUNTER — TELEPHONE (OUTPATIENT)
Dept: OBGYN CLINIC | Facility: MEDICAL CENTER | Age: 24
End: 2024-10-21

## 2024-10-21 ENCOUNTER — CLINICAL SUPPORT (OUTPATIENT)
Dept: OBGYN CLINIC | Facility: CLINIC | Age: 24
End: 2024-10-21

## 2024-10-21 VITALS
WEIGHT: 121.6 LBS | HEART RATE: 76 BPM | SYSTOLIC BLOOD PRESSURE: 110 MMHG | DIASTOLIC BLOOD PRESSURE: 74 MMHG | BODY MASS INDEX: 24.52 KG/M2 | HEIGHT: 59 IN

## 2024-10-21 DIAGNOSIS — Z01.30 BLOOD PRESSURE CHECK: Primary | ICD-10-CM

## 2024-10-21 NOTE — TELEPHONE ENCOUNTER
Patient has been noncompliant in BP monitoring program. Patient has not submitted blood pressure readings since 10/16.  Called to remind patient to submit readings.  Spoke to patient.  Reviewed instructions for submitting readings. Verbalized understanding and will submit BP readings.

## 2024-10-21 NOTE — PROGRESS NOTES
Patient is here for a blood pressure check. It was 110/74. Dr. Dugan was made aware, patient was advised to keep taking the nifedipine until her next appointment with us on 10/28/2024 Per Dr. Dugan. Patient verbalized understanding.

## 2024-10-24 ENCOUNTER — TELEPHONE (OUTPATIENT)
Dept: OBGYN CLINIC | Facility: CLINIC | Age: 24
End: 2024-10-24

## 2024-10-24 NOTE — TELEPHONE ENCOUNTER
Patient has been noncompliant in BP monitoring program.  Has not submitted blood pressure readings since 10/21.  Called to remind patient to submit readings.  Spoke to patient.  Reviewed instructions for submitting readings. Verbalized understanding and will submit BP readings.

## 2024-10-28 ENCOUNTER — TELEPHONE (OUTPATIENT)
Dept: OBGYN CLINIC | Facility: MEDICAL CENTER | Age: 24
End: 2024-10-28

## 2024-10-28 ENCOUNTER — POSTPARTUM VISIT (OUTPATIENT)
Dept: OBGYN CLINIC | Facility: CLINIC | Age: 24
End: 2024-10-28

## 2024-10-28 VITALS
BODY MASS INDEX: 24.19 KG/M2 | DIASTOLIC BLOOD PRESSURE: 64 MMHG | HEIGHT: 59 IN | WEIGHT: 120 LBS | HEART RATE: 83 BPM | SYSTOLIC BLOOD PRESSURE: 110 MMHG

## 2024-10-28 DIAGNOSIS — K59.00 CONSTIPATION, UNSPECIFIED CONSTIPATION TYPE: ICD-10-CM

## 2024-10-28 PROBLEM — Z34.90 ENCOUNTER FOR INDUCTION OF LABOR: Status: RESOLVED | Noted: 2024-10-03 | Resolved: 2024-10-28

## 2024-10-28 PROBLEM — R82.71 GBS BACTERIURIA: Status: RESOLVED | Noted: 2024-08-06 | Resolved: 2024-10-28

## 2024-10-28 PROBLEM — R87.610 ATYPICAL SQUAMOUS CELLS OF UNDETERMINED SIGNIFICANCE ON CYTOLOGIC SMEAR OF CERVIX (ASC-US): Status: RESOLVED | Noted: 2024-08-12 | Resolved: 2024-10-28

## 2024-10-28 PROBLEM — O10.919 CHRONIC HYPERTENSION AFFECTING PREGNANCY: Status: RESOLVED | Noted: 2024-07-25 | Resolved: 2024-10-28

## 2024-10-28 PROBLEM — Z78.9 NONIMMUNE TO HEPATITIS B VIRUS: Status: RESOLVED | Noted: 2024-08-02 | Resolved: 2024-10-28

## 2024-10-28 PROCEDURE — 99213 OFFICE O/P EST LOW 20 MIN: CPT | Performed by: NURSE PRACTITIONER

## 2024-10-28 RX ORDER — DOCUSATE SODIUM 100 MG/1
100 CAPSULE, LIQUID FILLED ORAL 2 TIMES DAILY
Qty: 60 CAPSULE | Refills: 2 | Status: SHIPPED | OUTPATIENT
Start: 2024-10-28

## 2024-10-28 NOTE — TELEPHONE ENCOUNTER
Patient has been noncompliant in BP monitoring program.  Has not submitted blood pressure readings since 10/24.  Called to remind patient to submit readings.  Spoke to patient.  Reviewed instructions for submitting readings. Verbalized understanding and will submit BP readings.

## 2024-10-28 NOTE — PROGRESS NOTES
"POSTPARTUM VISIT    Kerline Mcdonald presents today for postpartum visit.  She had a vaginal delivery on 10/4/2024.  Complications included preeclampsia for which she was discharged home on Procardia XL 60mg daily which she continues with currently.  She reports BP checks at home only twice during the past week as follows:  109/77, 115/82.  She is breast and bottlefeeding her infant and reports no issues with such.  She desires Nexplanon for contraception.  She was provided with [place] Depression Screening tool and her score was 3.    Review of Systems:   -Constitutional: denies issues, denies pain   -Breasts: denies tenderness   -Gynecologic: lochia light flow   -Urinary: denies issues urinating   -GI: stools constipated since delivery    Physical Exam:   -Vitals:   Vitals:    10/28/24 1416   BP: 110/64   BP Location: Left arm   Patient Position: Sitting   Cuff Size: Standard   Pulse: 83   Weight: 54.4 kg (120 lb)   Height: 4' 11\" (1.499 m)      -General: A&Ox3, no acute distress noted   -Abdomen: soft, non-tender   -Extremities: nontender, no edema noted   -Breasts: deferred   -Pelvic exam: deferred    Assessment/Plan:  1. Normal postpartum exam.  2. Depression screening negative.  3. Referral ordered for postpartum Physical Therapy consultation.  4. Last pap smear was done 7/25/2024 and result was ASCUS with + other HRHPV.  Advise return for next annual GYN exam in 7/2025.  5. May discontinue Procardia at this time.  Return in 1 week for BP check.  6. Contraception: Desires Nexplanon.  Will insert next week at her next visit.  7. Given Rx Colace.      "

## 2024-10-28 NOTE — PATIENT INSTRUCTIONS
Yariel por schmid confianza en nuestro equipo.   Le agradecemos y agradecemos dario comentarios.   Si recibe ju encuesta nuestra, tómese unos momentos para informarnos cómo estamos.   Sinceramente,  RAE Wilson       DESPUÉS DEL PARTO      Debe comunicarse con schmid proveedor de GINECÓLOGO si usted experimenta cualquiera de los siguientes:  1. sangrado que empapa ju almohadilla cada hora maggie 2 horas.  2. mal olor procedente de la vagina.  3. fiebre de 100.4 o superior.  4. incisión o dolor abdominal que no irá lejos a pesar de prescribe medicamentos para el dolor.  5. hinchazón, enrojecimiento, secreción o sangrado de la incisión de cesárea o sitio de desgarro perineal.  6. la incisión se comienza a separar.  7. problemas para orinar incluyendo incapacidad para orinar, ardor al orinar o muy oscura de la orina.  8. no movimiento intestinal dentro de 4 días de jocelyn a ben, o la dificultad con las deposiciones después de eso.  9. cualquier tipo de disturbio visual (visión doble, Desenfoque, etcetera).  10. cefalea.  11. gripe-hollis síntomas.  12. dolor o enrojecimiento en nan o ambos de dario pechos.  13. dolor, calor, sensibilidad o hinchazón en las piernas, especialmente la deborah de la pantorrilla.  14. frecuentes náuseas y vómitos.  15. signos de depresión o ansiedad.  16. Si experimenta solange de pecho o tiene problemas para respirar, llame al 911 inmediatamente.    En general puede reanudar el coito sexual después de 6 semanas de la entrega.  Es importante continuar cambiando tus toallas sanitarias con frecuencia y limpiar el perineo al menos 2 - 3 veces al día.

## 2024-11-04 ENCOUNTER — POSTPARTUM VISIT (OUTPATIENT)
Dept: OBGYN CLINIC | Facility: CLINIC | Age: 24
End: 2024-11-04

## 2024-11-04 VITALS
DIASTOLIC BLOOD PRESSURE: 69 MMHG | HEART RATE: 76 BPM | WEIGHT: 121 LBS | BODY MASS INDEX: 24.44 KG/M2 | SYSTOLIC BLOOD PRESSURE: 114 MMHG

## 2024-11-04 DIAGNOSIS — Z30.09 UNWANTED FERTILITY: Primary | ICD-10-CM

## 2024-11-04 DIAGNOSIS — Z30.017 NEXPLANON INSERTION: ICD-10-CM

## 2024-11-04 PROCEDURE — 11981 INSERTION DRUG DLVR IMPLANT: CPT | Performed by: NURSE PRACTITIONER

## 2024-11-04 RX ORDER — LIDOCAINE HYDROCHLORIDE AND EPINEPHRINE BITARTRATE 20; .01 MG/ML; MG/ML
3 INJECTION, SOLUTION SUBCUTANEOUS ONCE
Status: COMPLETED | OUTPATIENT
Start: 2024-11-04 | End: 2024-11-04

## 2024-11-04 RX ADMIN — LIDOCAINE HYDROCHLORIDE AND EPINEPHRINE BITARTRATE 3 ML: 20; .01 INJECTION, SOLUTION SUBCUTANEOUS at 13:39

## 2024-11-04 NOTE — PROGRESS NOTES
"Universal Protocol:  procedure performed by consultantConsent: Verbal consent obtained. Written consent obtained.  Risks and benefits: risks, benefits and alternatives were discussed  Consent given by: patient  Time out: Immediately prior to procedure a \"time out\" was called to verify the correct patient, procedure, equipment, support staff and site/side marked as required.  Patient understanding: patient states understanding of the procedure being performed  Patient consent: the patient's understanding of the procedure matches consent given  Procedure consent: procedure consent matches procedure scheduled  Required items: required blood products, implants, devices, and special equipment available  Patient identity confirmed: verbally with patient  Remove and insert drug implant    Date/Time: 11/4/2024 1:00 PM    Performed by: RAE Wilson  Authorized by: RAE Wilson    Indication:     Indication: Insertion of non-biodegradable drug delivery implant    Pre-procedure:     Pre-procedure timeout performed: yes      Prepped with: alcohol 70% and povidone-iodine      Local anesthetic:  Lidocaine with epinephrine    The site was cleaned and prepped in a sterile fashion: yes    Procedure:     Procedure:  Insertion    Left/right:  Left    Preloaded contraceptive capsule trocar was placed subdermally: yes      Contraceptive capsule was inserted and trocar removed: yes      Palpation confirms placement by provider and patient: yes      Site was closed with steri-strips and pressure bandage applied: yes        "

## 2024-11-04 NOTE — PATIENT INSTRUCTIONS
Yariel por schmid confianza en nuestro equipo.   Le agradecemos y agradecemos dario comentarios.   Si recibe ju encuesta nuestra, tómese unos momentos para informarnos cómo estamos.   Sinceramente,  RAE Wilson

## 2024-12-04 ENCOUNTER — APPOINTMENT (OUTPATIENT)
Dept: LAB | Facility: HOSPITAL | Age: 24
End: 2024-12-04

## 2024-12-04 ENCOUNTER — OFFICE VISIT (OUTPATIENT)
Dept: OBGYN CLINIC | Facility: CLINIC | Age: 24
End: 2024-12-04

## 2024-12-04 VITALS
HEIGHT: 59 IN | DIASTOLIC BLOOD PRESSURE: 68 MMHG | HEART RATE: 82 BPM | SYSTOLIC BLOOD PRESSURE: 113 MMHG | BODY MASS INDEX: 23.95 KG/M2 | WEIGHT: 118.8 LBS

## 2024-12-04 DIAGNOSIS — N93.9 ABNORMAL UTERINE BLEEDING (AUB): Primary | ICD-10-CM

## 2024-12-04 DIAGNOSIS — N93.9 ABNORMAL UTERINE BLEEDING (AUB): ICD-10-CM

## 2024-12-04 PROBLEM — O99.019 MATERNAL IRON DEFICIENCY ANEMIA COMPLICATING PREGNANCY: Status: RESOLVED | Noted: 2024-08-12 | Resolved: 2024-12-04

## 2024-12-04 PROBLEM — D50.9 MATERNAL IRON DEFICIENCY ANEMIA COMPLICATING PREGNANCY: Status: RESOLVED | Noted: 2024-08-12 | Resolved: 2024-12-04

## 2024-12-04 LAB
ERYTHROCYTE [DISTWIDTH] IN BLOOD BY AUTOMATED COUNT: 17.6 % (ref 11.6–15.1)
HCT VFR BLD AUTO: 35.7 % (ref 34.8–46.1)
HGB BLD-MCNC: 11.9 G/DL (ref 11.5–15.4)
MCH RBC QN AUTO: 28.5 PG (ref 26.8–34.3)
MCHC RBC AUTO-ENTMCNC: 33.3 G/DL (ref 31.4–37.4)
MCV RBC AUTO: 86 FL (ref 82–98)
PLATELET # BLD AUTO: 281 THOUSANDS/UL (ref 149–390)
PMV BLD AUTO: 9.2 FL (ref 8.9–12.7)
RBC # BLD AUTO: 4.17 MILLION/UL (ref 3.81–5.12)
WBC # BLD AUTO: 8.8 THOUSAND/UL (ref 4.31–10.16)

## 2024-12-04 PROCEDURE — 99213 OFFICE O/P EST LOW 20 MIN: CPT | Performed by: NURSE PRACTITIONER

## 2024-12-04 PROCEDURE — 85027 COMPLETE CBC AUTOMATED: CPT

## 2024-12-04 PROCEDURE — 36415 COLL VENOUS BLD VENIPUNCTURE: CPT

## 2024-12-04 NOTE — PROGRESS NOTES
"PROBLEM GYNECOLOGICAL VISIT    Kerline Mcdonald is a 24 y.o. female who presents today with complaint of AUB.  Her general medical history has been reviewed and she reports it as follows:    Past Medical History:   Diagnosis Date    Abnormal Pap smear of cervix     2024 ASCUS pap/+ other HRHPV    Asthma     Hypertension     Preeclampsia      Past Surgical History:   Procedure Laterality Date    INGUINAL HERNIA REPAIR Left      OB History          4    Para   3    Term   3       0    AB   1    Living   3         SAB   1    IAB   0    Ectopic   0    Multiple   0    Live Births   3               Social History     Tobacco Use    Smoking status: Never     Passive exposure: Never    Smokeless tobacco: Never   Vaping Use    Vaping status: Never Used   Substance Use Topics    Alcohol use: Not Currently    Drug use: Never     Social History     Substance and Sexual Activity   Sexual Activity Yes    Partners: Male       Current Outpatient Medications   Medication Instructions    docusate sodium (COLACE) 100 mg, Oral, 2 times daily    etonogestrel (NEXPLANON) 68 mg, Subdermal, Once every 3 years -  Implant and IUD       History of Present Illness:   Reports that since Nexplanon inserted 2024 she has been having vaginal bleeding almost every day and is sometimes very heavy with clots.  She reports that she feels very \"tired and pale\".    Review of Systems:  Review of Systems   Constitutional:  Positive for fatigue.   Gastrointestinal: Negative.    Genitourinary:  Positive for vaginal bleeding (AUB). Negative for pelvic pain.       Physical Exam:  /68 (BP Location: Left arm, Patient Position: Sitting, Cuff Size: Standard)   Pulse 82   Ht 4' 11\" (1.499 m)   Wt 53.9 kg (118 lb 12.8 oz)   BMI 23.99 kg/m²   Physical Exam  Constitutional:       General: She is not in acute distress.  Neurological:      Mental Status: She is alert.   Skin:     General: Skin is warm and dry.   Vitals reviewed. "       Assessment:   1. AUB secondary to Nexplanon.    Plan:   1. Reviewed with patient that AUB is the most common side effect of Nexplanon and that this was reviewed with her prior to insertion.   2. Bloodwork ordered: CBC.  Will call with results.   3. Return to office as previously scheduled.    [facility] with patient [facility] but that they will not necessarily be reviewed by me immediately.  Explained that I will review results at my earliest opportunity and contact patient appropriately.

## 2024-12-05 ENCOUNTER — RESULTS FOLLOW-UP (OUTPATIENT)
Dept: OBGYN CLINIC | Facility: CLINIC | Age: 24
End: 2024-12-05

## 2025-07-28 ENCOUNTER — ANNUAL EXAM (OUTPATIENT)
Dept: OBGYN CLINIC | Facility: CLINIC | Age: 25
End: 2025-07-28

## 2025-07-28 ENCOUNTER — APPOINTMENT (OUTPATIENT)
Dept: LAB | Facility: CLINIC | Age: 25
End: 2025-07-28

## 2025-07-28 VITALS — BODY MASS INDEX: 21.05 KG/M2 | SYSTOLIC BLOOD PRESSURE: 100 MMHG | DIASTOLIC BLOOD PRESSURE: 80 MMHG | WEIGHT: 104.2 LBS

## 2025-07-28 DIAGNOSIS — Z12.39 ENCOUNTER FOR BREAST CANCER SCREENING USING NON-MAMMOGRAM MODALITY: ICD-10-CM

## 2025-07-28 DIAGNOSIS — Z12.4 SCREENING FOR CERVICAL CANCER: ICD-10-CM

## 2025-07-28 DIAGNOSIS — Z11.3 SCREEN FOR STD (SEXUALLY TRANSMITTED DISEASE): ICD-10-CM

## 2025-07-28 DIAGNOSIS — Z01.419 ENCOUNTER FOR GYNECOLOGICAL EXAMINATION WITHOUT ABNORMAL FINDING: Primary | ICD-10-CM

## 2025-07-28 DIAGNOSIS — Z59.12 INADEQUATE HOUSING UTILITIES: ICD-10-CM

## 2025-07-28 DIAGNOSIS — Z23 NEED FOR HPV VACCINATION: ICD-10-CM

## 2025-07-28 LAB — TREPONEMA PALLIDUM IGG+IGM AB [PRESENCE] IN SERUM OR PLASMA BY IMMUNOASSAY: NORMAL

## 2025-07-28 PROCEDURE — 87661 TRICHOMONAS VAGINALIS AMPLIF: CPT | Performed by: NURSE PRACTITIONER

## 2025-07-28 PROCEDURE — 87340 HEPATITIS B SURFACE AG IA: CPT

## 2025-07-28 PROCEDURE — 36415 COLL VENOUS BLD VENIPUNCTURE: CPT

## 2025-07-28 PROCEDURE — 87591 N.GONORRHOEAE DNA AMP PROB: CPT | Performed by: NURSE PRACTITIONER

## 2025-07-28 PROCEDURE — 87563 M. GENITALIUM AMP PROBE: CPT | Performed by: NURSE PRACTITIONER

## 2025-07-28 PROCEDURE — 87491 CHLMYD TRACH DNA AMP PROBE: CPT | Performed by: NURSE PRACTITIONER

## 2025-07-28 PROCEDURE — G0145 SCR C/V CYTO,THINLAYER,RESCR: HCPCS | Performed by: PATHOLOGY

## 2025-07-28 PROCEDURE — 87389 HIV-1 AG W/HIV-1&-2 AB AG IA: CPT

## 2025-07-28 PROCEDURE — 99395 PREV VISIT EST AGE 18-39: CPT | Performed by: NURSE PRACTITIONER

## 2025-07-28 PROCEDURE — 90471 IMMUNIZATION ADMIN: CPT | Performed by: NURSE PRACTITIONER

## 2025-07-28 PROCEDURE — 86803 HEPATITIS C AB TEST: CPT

## 2025-07-28 PROCEDURE — 86780 TREPONEMA PALLIDUM: CPT

## 2025-07-28 PROCEDURE — 90651 9VHPV VACCINE 2/3 DOSE IM: CPT | Performed by: NURSE PRACTITIONER

## 2025-07-28 SDOH — ECONOMIC STABILITY - HOUSING INSECURITY: INADEQUATE HOUSING UTILITIES: Z59.12

## 2025-07-29 LAB
C TRACH DNA SPEC QL NAA+PROBE: NEGATIVE
HBV SURFACE AG SER QL: NORMAL
HCV AB SER QL: NORMAL
HIV 1+2 AB+HIV1 P24 AG SERPL QL IA: NORMAL
M GENITALIUM DNA SPEC QL NAA+PROBE: NEGATIVE
N GONORRHOEA DNA SPEC QL NAA+PROBE: NEGATIVE
T VAGINALIS DNA SPEC QL NAA+PROBE: NEGATIVE

## 2025-07-30 ENCOUNTER — PATIENT OUTREACH (OUTPATIENT)
Dept: OBGYN CLINIC | Facility: CLINIC | Age: 25
End: 2025-07-30

## 2025-07-30 ENCOUNTER — TELEPHONE (OUTPATIENT)
Facility: HOSPITAL | Age: 25
End: 2025-07-30

## 2025-08-04 ENCOUNTER — TELEPHONE (OUTPATIENT)
Dept: OBGYN CLINIC | Facility: CLINIC | Age: 25
End: 2025-08-04

## 2025-08-04 LAB
LAB AP GYN PRIMARY INTERPRETATION: ABNORMAL
Lab: ABNORMAL
PATH INTERP SPEC-IMP: ABNORMAL

## 2025-08-07 ENCOUNTER — PATIENT OUTREACH (OUTPATIENT)
Dept: OBGYN CLINIC | Facility: CLINIC | Age: 25
End: 2025-08-07

## 2025-08-12 ENCOUNTER — TELEPHONE (OUTPATIENT)
Facility: HOSPITAL | Age: 25
End: 2025-08-12

## 2025-08-13 ENCOUNTER — PATIENT OUTREACH (OUTPATIENT)
Facility: HOSPITAL | Age: 25
End: 2025-08-13

## 2025-08-13 ENCOUNTER — TELEPHONE (OUTPATIENT)
Facility: HOSPITAL | Age: 25
End: 2025-08-13